# Patient Record
Sex: MALE | Race: WHITE | NOT HISPANIC OR LATINO | Employment: UNEMPLOYED | URBAN - METROPOLITAN AREA
[De-identification: names, ages, dates, MRNs, and addresses within clinical notes are randomized per-mention and may not be internally consistent; named-entity substitution may affect disease eponyms.]

---

## 2020-01-28 ENCOUNTER — HOSPITAL ENCOUNTER (EMERGENCY)
Facility: HOSPITAL | Age: 60
Discharge: HOME | End: 2020-01-28
Attending: EMERGENCY MEDICINE

## 2020-01-28 VITALS
RESPIRATION RATE: 16 BRPM | HEART RATE: 85 BPM | DIASTOLIC BLOOD PRESSURE: 86 MMHG | BODY MASS INDEX: 27.49 KG/M2 | OXYGEN SATURATION: 98 % | HEIGHT: 70 IN | WEIGHT: 192 LBS | SYSTOLIC BLOOD PRESSURE: 191 MMHG | TEMPERATURE: 99 F

## 2020-01-28 DIAGNOSIS — M79.672 LEFT FOOT PAIN: ICD-10-CM

## 2020-01-28 DIAGNOSIS — S90.32XA CONTUSION OF LEFT FOOT, INITIAL ENCOUNTER: Primary | ICD-10-CM

## 2020-01-28 PROCEDURE — 99283 EMERGENCY DEPT VISIT LOW MDM: CPT | Mod: 25

## 2020-01-28 PROCEDURE — 99284 EMERGENCY DEPT VISIT MOD MDM: CPT | Mod: ,,, | Performed by: EMERGENCY MEDICINE

## 2020-01-28 PROCEDURE — 99284 PR EMERGENCY DEPT VISIT,LEVEL IV: ICD-10-PCS | Mod: ,,, | Performed by: EMERGENCY MEDICINE

## 2020-01-28 RX ORDER — LISINOPRIL 20 MG/1
20 TABLET ORAL DAILY
Status: ON HOLD | COMMUNITY
End: 2023-01-20

## 2020-01-28 RX ORDER — NAPROXEN 500 MG/1
500 TABLET ORAL 2 TIMES DAILY WITH MEALS
Qty: 20 TABLET | Refills: 0 | Status: SHIPPED | OUTPATIENT
Start: 2020-01-28 | End: 2020-02-07

## 2020-01-28 NOTE — DISCHARGE INSTRUCTIONS
Your Xray today was negative for a fracture.  It is possible you sprained or contused your foot.    I recommend close follow up with your doctor. If you don't have a doctor you can call [facility] internal medicine or [facility] to help you find one.   I have also given you the information for orthopedics if you have persistent pain.    Elevate your foot. Use ice packs. Continue taking Aleve. Weight bear as tolerated.

## 2020-01-28 NOTE — ED NOTES
Patient identifiers verified and correct for Mr Jackson  C/C: Pain to left foot SEE NN  APPEARANCE: awake and alert in NAD.  SKIN: warm, dry and intact. No breakdown or bruising.  MUSCULOSKELETAL: Patient moving all extremities spontaneously, no obvious swelling or deformities noted. Ambulates independently.  RESPIRATORY: Denies shortness of breath.Respirations unlabored.   CARDIAC: Denies CP, 2+ distal pulses; no peripheral edema  ABDOMEN: S/ND/NT, Denies nausea  : voids spontaneously, denies difficulty  Neurologic: AAO x 4; follows commands equal strength in all extremities; denies numbness/tingling. Denies dizziness Denies weakness, pain to left side of left foot
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
## 2020-03-02 ENCOUNTER — HOSPITAL ENCOUNTER (EMERGENCY)
Facility: HOSPITAL | Age: 60
Discharge: HOME | End: 2020-03-02
Attending: EMERGENCY MEDICINE

## 2020-03-02 VITALS
BODY MASS INDEX: 27.63 KG/M2 | HEART RATE: 80 BPM | SYSTOLIC BLOOD PRESSURE: 144 MMHG | DIASTOLIC BLOOD PRESSURE: 94 MMHG | OXYGEN SATURATION: 94 % | RESPIRATION RATE: 19 BRPM | WEIGHT: 193 LBS | TEMPERATURE: 99 F | HEIGHT: 70 IN

## 2020-03-02 DIAGNOSIS — T78.3XXA ANGIOEDEMA, INITIAL ENCOUNTER: Primary | ICD-10-CM

## 2020-03-02 PROBLEM — T46.4X5A ANGIOTENSIN CONVERTING ENZYME INHIBITOR-AGGRAVATED ANGIOEDEMA: Status: ACTIVE | Noted: 2020-03-02

## 2020-03-02 LAB
BASOPHILS # BLD AUTO: 0.09 K/UL (ref 0–0.2)
BASOPHILS NFR BLD: 1 % (ref 0–1.9)
BUN SERPL-MCNC: 10 MG/DL (ref 6–30)
CHLORIDE SERPL-SCNC: 101 MMOL/L (ref 95–110)
CREAT SERPL-MCNC: 1.1 MG/DL (ref 0.5–1.4)
DIFFERENTIAL METHOD: ABNORMAL
EOSINOPHIL # BLD AUTO: 0.5 K/UL (ref 0–0.5)
EOSINOPHIL NFR BLD: 5.7 % (ref 0–8)
ERYTHROCYTE [DISTWIDTH] IN BLOOD BY AUTOMATED COUNT: 13.3 % (ref 11.5–14.5)
GLUCOSE SERPL-MCNC: 98 MG/DL (ref 70–110)
HCT VFR BLD AUTO: 41.1 % (ref 40–54)
HCT VFR BLD CALC: 41 %PCV (ref 36–54)
HGB BLD-MCNC: 13.3 G/DL (ref 14–18)
IMM GRANULOCYTES # BLD AUTO: 0.03 K/UL (ref 0–0.04)
IMM GRANULOCYTES NFR BLD AUTO: 0.3 % (ref 0–0.5)
LYMPHOCYTES # BLD AUTO: 2.2 K/UL (ref 1–4.8)
LYMPHOCYTES NFR BLD: 25.3 % (ref 18–48)
MCH RBC QN AUTO: 28.9 PG (ref 27–31)
MCHC RBC AUTO-ENTMCNC: 32.4 G/DL (ref 32–36)
MCV RBC AUTO: 89 FL (ref 82–98)
MONOCYTES # BLD AUTO: 1.1 K/UL (ref 0.3–1)
MONOCYTES NFR BLD: 13.2 % (ref 4–15)
NEUTROPHILS # BLD AUTO: 4.7 K/UL (ref 1.8–7.7)
NEUTROPHILS NFR BLD: 54.5 % (ref 38–73)
NRBC BLD-RTO: 0 /100 WBC
PLATELET # BLD AUTO: 398 K/UL (ref 150–350)
PMV BLD AUTO: 9.6 FL (ref 9.2–12.9)
POC IONIZED CALCIUM: 1.16 MMOL/L (ref 1.06–1.42)
POC TCO2 (MEASURED): 22 MMOL/L (ref 23–29)
POTASSIUM BLD-SCNC: 3.5 MMOL/L (ref 3.5–5.1)
RBC # BLD AUTO: 4.6 M/UL (ref 4.6–6.2)
SAMPLE: ABNORMAL
SODIUM BLD-SCNC: 134 MMOL/L (ref 136–145)
WBC # BLD AUTO: 8.65 K/UL (ref 3.9–12.7)

## 2020-03-02 PROCEDURE — 99291 CRITICAL CARE FIRST HOUR: CPT | Mod: ,,, | Performed by: EMERGENCY MEDICINE

## 2020-03-02 PROCEDURE — 85025 COMPLETE CBC W/AUTO DIFF WBC: CPT

## 2020-03-02 PROCEDURE — 96374 THER/PROPH/DIAG INJ IV PUSH: CPT

## 2020-03-02 PROCEDURE — 99291 PR CRITICAL CARE, E/M 30-74 MINUTES: ICD-10-PCS | Mod: ,,, | Performed by: EMERGENCY MEDICINE

## 2020-03-02 PROCEDURE — 99284 EMERGENCY DEPT VISIT MOD MDM: CPT | Mod: 25

## 2020-03-02 PROCEDURE — 96375 TX/PRO/DX INJ NEW DRUG ADDON: CPT

## 2020-03-02 PROCEDURE — 63600175 PHARM REV CODE 636 W HCPCS: Performed by: EMERGENCY MEDICINE

## 2020-03-02 RX ORDER — DIPHENHYDRAMINE HYDROCHLORIDE 50 MG/ML
50 INJECTION INTRAMUSCULAR; INTRAVENOUS
Status: COMPLETED | OUTPATIENT
Start: 2020-03-02 | End: 2020-03-02

## 2020-03-02 RX ORDER — DEXAMETHASONE SODIUM PHOSPHATE 4 MG/ML
10 INJECTION, SOLUTION INTRA-ARTICULAR; INTRALESIONAL; INTRAMUSCULAR; INTRAVENOUS; SOFT TISSUE
Status: COMPLETED | OUTPATIENT
Start: 2020-03-02 | End: 2020-03-02

## 2020-03-02 RX ORDER — AMLODIPINE BESYLATE 10 MG/1
10 TABLET ORAL DAILY
Qty: 30 TABLET | Refills: 0 | Status: SHIPPED | OUTPATIENT
Start: 2020-03-02 | End: 2021-09-03 | Stop reason: SDUPTHER

## 2020-03-02 RX ADMIN — DEXAMETHASONE SODIUM PHOSPHATE 10 MG: 4 INJECTION, SOLUTION INTRA-ARTICULAR; INTRALESIONAL; INTRAMUSCULAR; INTRAVENOUS; SOFT TISSUE at 03:03

## 2020-03-02 RX ADMIN — DIPHENHYDRAMINE HYDROCHLORIDE 50 MG: 50 INJECTION, SOLUTION INTRAMUSCULAR; INTRAVENOUS at 03:03

## 2020-03-02 NOTE — ED PROVIDER NOTES
Encounter Date: 3/2/2020       History     Chief Complaint   Patient presents with    Facial Swelling     neck keeps getting swollen,      59-year-old male, history of hypertension, on lisinopril, presenting with facial swelling since last night.  Patient went to sleep in his usual state of health and then awoke overnight because his tongue was swollen.  He went back to sleep and when he woke up this morning his tongue swelling had improved but he noticed that his upper neck was swollen and that he had a change in his voice.  He took his prescribed lisinopril at 7:00 a.m. this morning.  Since that time his symptoms have persisted but they have not actually worsened.  No shortness of breath. Positive abnormal sensation in his throat.    The history is provided by the patient.     Review of patient's allergies indicates:   Allergen Reactions    Lisinopril Swelling     Past Medical History:   Diagnosis Date    Hypertension     RLS (restless legs syndrome)      History reviewed. No pertinent surgical history.  History reviewed. No pertinent family history.  Social History     Tobacco Use    Smoking status: Current Every Day Smoker     Packs/day: 1.00     Types: Cigarettes    Smokeless tobacco: Never Used   Substance Use Topics    Alcohol use: Not Currently     Comment: occas    Drug use: No     Review of Systems   Constitutional: Negative for chills and fever.   HENT: Positive for sore throat and voice change. Negative for drooling and facial swelling.    Respiratory: Negative for cough, choking, chest tightness, shortness of breath and wheezing.    Musculoskeletal: Negative for neck pain.   Skin: Negative for rash.   All other systems reviewed and are negative.      Physical Exam     Initial Vitals [03/02/20 1509]   BP Pulse Resp Temp SpO2   (!) 154/70 90 18 98.7 °F (37.1 °C) 97 %      MAP       --         Physical Exam    Nursing note and vitals reviewed.  Constitutional: Vital signs are normal. He appears  well-developed and well-nourished. He is not diaphoretic.  Non-toxic appearance. He does not appear ill. No distress.   HENT:   Head: Normocephalic and atraumatic.   Mouth/Throat: Uvula is midline and mucous membranes are normal.   No lip or tongue swelling  Positive swelling of the uvula  Positive slight muffling of the voice  No drooling or stridor  Positive swelling to the right anterior region of the neck   Eyes: Conjunctivae and lids are normal.   Neck: Normal range of motion. Neck supple.   Cardiovascular: Normal rate, regular rhythm, S1 normal and S2 normal.   No murmur heard.  Pulmonary/Chest: Breath sounds normal. No respiratory distress.   Abdominal: Normal appearance. He exhibits no distension and no ascites.   Musculoskeletal: He exhibits no edema.   Neurological: He is alert and oriented to person, place, and time. He has normal strength. No cranial nerve deficit.   Skin: Skin is warm, dry and intact. No rash noted. No cyanosis. No pallor.   Psychiatric: He has a normal mood and affect. His speech is normal and behavior is normal. He is not actively hallucinating. He is attentive.         ED Course   Procedures  Labs Reviewed   CBC W/ AUTO DIFFERENTIAL - Abnormal; Notable for the following components:       Result Value    Hemoglobin 13.3 (*)     Platelets 398 (*)     Mono # 1.1 (*)     All other components within normal limits   ISTAT PROCEDURE - Abnormal; Notable for the following components:    POC Sodium 134 (*)     POC TCO2 (MEASURED) 22 (*)     All other components within normal limits          Imaging Results    None          Medical Decision Making:   History:   Old Medical Records: I decided to obtain old medical records.  Initial Assessment:   59-year-old male, on lisinopril for hypertension, now presenting with angioedema    He has hemodynamically stable but he does have signs of possible airway involvement, including a swollen uvula and voice change  Clinical Tests:   Lab Tests: Ordered and  Reviewed  ED Management:  ENT consulted for bedside scope  Labs  IV Benadryl and Decadron ordered  Anticipate admission overnight for airway watch              Attending Attestation:         Attending Critical Care:   Critical Care Times:   ==============================================================  · Total Critical Care Time - exclusive of procedural time: 35 minutes.  ==============================================================  Critical care was necessary to treat or prevent imminent or life-threatening deterioration of the following conditions: airway compromise.   Critical care was time spent personally by me on the following activities: obtaining history from patient or relative, examination of patient, review of x-rays / CT sent with the patient, review of old charts, ordering lab, x-rays, and/or EKG, ordering and performing treatments and interventions, evaluation of patient's response to treatment, development of treatment plan with patient or relative, discussion with consultants and re-evaluation of patient's conition.   Critical Care Condition: critical                             Clinical Impression:       ICD-10-CM ICD-9-CM   1. Angioedema, initial encounter T78.3XXA 995.1             ED Disposition Condition    Discharge Stable        ED Prescriptions     Medication Sig Dispense Start Date End Date Auth. Provider    amLODIPine (NORVASC) 10 MG tablet Take 1 tablet (10 mg total) by mouth once daily. 30 tablet 3/2/2020 3/2/2021 Hamzah Sandra MD        Follow-up Information     Follow up With Specialties Details Why Contact Info Additional Information    [facility] - Internal Medicine Internal Medicine   [address]  [facility] [place] [facility] [id number] [phone] [facility].                                     Mone Tran MD  03/03/20 3894

## 2020-03-02 NOTE — CONSULTS
Otolaryngology - Head and Neck Surgery  Consultation Report    Consultation From:  ER; Mone Tran MD    Chief Complaint:  Throat swelling    History of Present Illness: 59 y.o. male presents with progressive throat and neck swelling that began this AM. He reports he had voice changes as well, but has had some improvement in symptoms  Since ER steroids and other treatment. Denies previous episodes, but is on ACE-I.    Review of Systems reviewed including    CONSTITUTIONAL: no fevers, chills, weight loss, night sweats  EYES: no diplopia, no blurry vision  ENT: as above   NEURO: no motor or sensory deficits  CV: no CP, no palpitations  PULM: no cough, no SOB, no wheezing   GI: no abd pain, no constipation/diarrhea  : no dysuria, no hematuria  MSK: no bone/joint pain    Past Medical History: Patient has a past medical history of Hypertension and RLS (restless legs syndrome).    Past Surgical History: Patient has no past surgical history on file.    Social History: Patient reports that he has been smoking cigarettes. He has been smoking about 1.00 pack per day. He has never used smokeless tobacco. He reports that he drank alcohol. He reports that he does not use drugs.    Family History: family history is not on file.    Medications: No current facility-administered medications for this encounter.     Current Outpatient Medications:     lisinopril (PRINIVIL,ZESTRIL) 20 MG tablet, Take 20 mg by mouth once daily., Disp: , Rfl:     UNABLE TO FIND, medication name: medication for RLS starts with M, Disp: , Rfl:        Allergies: Patient has No Known Allergies.    Physical Exam:  Temp:  [98.7 °F (37.1 °C)] 98.7 °F (37.1 °C)  Pulse:  [90] 90  Resp:  [18] 18  SpO2:  [97 %] 97 %  BP: (154)/(70) 154/70    Intake/Output  No intake or output data in the 24 hours ending 03/02/20 1645      General: NAD  Neuro: AAOx3. CN II-XII intact.  Respiratory: No labored breathing, no stridor  Head/Face: Normal inspection  Salivary  glands WNL.  Eyes: EOMI; PERRLA   Right Ear: Auricle WNL. EAC WNL. TM normal.      Left Ear: Auricle WNL. EAC WNL. TM normal.  Nose: normal ext appearance and palpation; Negative sinus pressure/tenderness;. Normal septum, inferior turbinates, mucosa.   OC: Lips and gingiva wnl.  Good dentition. FOM Soft.  Anterior Tongue nml size and mobility; Hard Palate wnl Uvula and soft palate with moderate edema  OP: BOT WNL. Soft palate wnl with Midline uvula.  Tonsils 2+ bilaterally. Posterior oropharynx patent, wnl  Neck/Lymphatic: Mild fullness to anterior neck.   No thyromegaly. Full ROM.    Flexible Fiberoptic Laryngoscopy   Verbal consent obtained.  Overall findings:  Nasal Cavity- normal   Nasopharynx   Adenoid tissue - normal    eustachian tube orifices - normal   Oropharynx   Uvula and soft palate with moderate edema   Posterior pharyngeal wall - normal   Base of tongue - normal    Valleculae - normal  Hypopharynx   Pyriform sinuses - normal    Post-cricoid normal  Supraglottis   Epiglottis - normal    AE folds- normal   Arytenoids - normal   Interarytenoid space - normal   False vocal cords - normal   Glottis   True vocal cords - mild edema  Subglottis: normal    LABORATORY  CBC  Recent Labs   Lab 03/02/20  1542 03/02/20  1546   WBC 8.65  --    HGB 13.3*  --    HCT 41.1 41   MCV 89  --    *  --      BMP  No results for input(s): GLU, NA, K, CL, CO2, BUN, CREATININE, CALCIUM, PHOS, MG, PREALBUMIN in the last 72 hours.  COAGS  No results for input(s): PROTIME, INR, PTT in the last 72 hours.    IMAGING  Imaging Results    None          Assessment: 59 y.o. year old male with likely ACE-I induced angioedema. FFL with soft palate and uvular edema. Mild TVC edema possible from chronic smoking hx.    Plan:   -Decadron 8 mg IV q8h  -IV H1 and H2 blockade  -nebulized epinephrine and steroid prn  -humidified oxygen  -recommend close observation in Medical ICU with cont pulse ox and telemetry until marked turnaround in  stx  -low threshold for intubation   -please have airway/intubation supplies   - Cleared for soft diet from ENT perspective  - Page ENT w questions or concerns      Abdulaziz Dimas MD  Otolaryngology PGY-IV

## 2020-03-02 NOTE — ED NOTES
Patient identifiers verified and correct for Dylan Jackson.    LOC: The patient is awake, alert and aware of environment    APPEARANCE: Patient resting comfortably and in no acute distress, visible facial swelling     SKIN: The skin is warm and dry, color consistent with ethnicity, patient has normal skin turgor and moist mucus membranes, facial redness/ swelling     MUSCULOSKELETAL: Patient moving all extremities spontaneously,     RESPIRATORY: Airway is open and patent, respirations are spontaneous, patient has a normal effort and rate, no accessory muscle use noted, bilateral breath sounds clear  CARDIAC: Patient has a normal rate and regular rhythm, no periphreal edema noted, capillary refill < 3 seconds.  ABDOMEN: Soft and non tender to palpation, no distention noted, normoactive bowel sounds present in all four quadrants.  NEUROLOGIC: PERRLA, 3 mm bilaterally, eyes open spontaneously, behavior appropriate to situation, follows commands, facial expression symmetrical, bilateral hand grasp equal and even, purposeful motor response noted, normal sensation in all extremities when touched with a finger.

## 2020-03-02 NOTE — PROVIDER PROGRESS NOTES - EMERGENCY DEPT.
Signed out to me from previous attending pending ENT consultation.  ENT note some lower airway angioedema as well in recommends ICU placement for further treatment and care.  ICU consulted and will be down to admit patient.  On my evaluation patient's voice remains hoarse, however he has no airway compromise or difficulty managing his secretions.    Update 1800:  ICU down to see patient, at this time patient's symptoms have completely resolved.  He no longer has a hoarse voice or any throat or neck swelling. I discussed case with the ICU fellow, who feels comfortable discussed inpatient.  I explained to the patient that he has any return of change of voice, swelling, trouble breathing or any other signs of head or neck discomfort, he must return immediately to emergency department.  I will discontinue his lisinopril and start him on amlodipine 10 mg for home.

## 2020-03-02 NOTE — ED TRIAGE NOTES
Dylan Jackson, a 59 y.o. male presents to the ED w/ complaint of neck swelling, tongue swelling and a sore throat, which began this morning. The tongue swelling was mostly on the right side of the tongue. Patient can tolerate his own saliva, solids and liquids. Patient endorses cold symptoms starting this pass Thursday.       Patient denies allergies and changes in diet, no new food    Patient currently takes Lisinopril               Triage note:  Chief Complaint   Patient presents with    Facial Swelling     neck keeps getting swollen,      Review of patient's allergies indicates:  No Known Allergies  Past Medical History:   Diagnosis Date    Hypertension     RLS (restless legs syndrome)

## 2020-03-03 NOTE — HPI
"Mr. Kenny is a 60yo male with HTN, who presents to the ED with tongue swelling and neck swelling. Patient states he woke up this morning with this symptoms. His tongue swelling went away after one hour after which he went to work. Patient noted his voice was hoarse and neck was still swollen at that time so he decided to come to the ED. He denies any SOB and difficulty swallowing.  Patient reports that few days ago he suspect he was coming down with a "cold" so he started taking Debbie seltzer. His only medication is Lisinopril which he has been taking for 5 years.In ED, patient was given decadron 10mg IV and Benadryl 50mg. On evaluation, patient reports being back to baseline.  "

## 2020-03-03 NOTE — CONSULTS
"[facility]  Critical Care Medicine  Consult Note    Patient Name: Dylan Jackson  MRN: [id number]  Admission Date: 3/2/2020  Hospital Length of Stay: 0 days  Code Status: No Order  Attending Physician: Mone Tran MD   Primary Care Provider: Primary Doctor No   Principal Problem: Angiotensin converting enzyme inhibitor-aggravated angioedema    Inpatient consult to Critical Care Medicine  Consult performed by: Aaliyah Pandya DO  Consult ordered by: Hamzah Sandra MD  Reason for consult: Angioedema        Subjective:     HPI:  Mr. Kenny is a 58yo male with HTN, who presents to the ED with tongue swelling and neck swelling. Patient states he woke up this morning with this symptoms. His tongue swelling went away after one hour after which he went to work. Patient noted his voice was hoarse and neck was still swollen at that time so he decided to come to the ED. He denies any SOB and difficulty swallowing.  Patient reports that few days ago he suspect he was coming down with a "cold" so he started taking Debbie seltzer. His only medication is Lisinopril which he has been taking for 5 years.In ED, patient was given decadron 10mg IV and Benadryl 50mg. On evaluation, patient reports being back to baseline.    Hospital/ICU Course:  No notes on file    Past Medical History:   Diagnosis Date    Hypertension     RLS (restless legs syndrome)        History reviewed. No pertinent surgical history.    Review of patient's allergies indicates:   Allergen Reactions    Lisinopril Swelling       Family History     None        Tobacco Use    Smoking status: Current Every Day Smoker     Packs/day: 1.00     Types: Cigarettes    Smokeless tobacco: Never Used   Substance and Sexual Activity    Alcohol use: Not Currently     Comment: occas    Drug use: No    Sexual activity: Not on file      Review of Systems   Constitutional: Negative for activity change and chills.   HENT: Positive for facial swelling. " Negative for congestion.    Eyes: Negative for photophobia and visual disturbance.   Respiratory: Positive for cough. Negative for choking, chest tightness and shortness of breath.    Cardiovascular: Negative for chest pain, palpitations and leg swelling.   Gastrointestinal: Negative for abdominal pain, constipation, diarrhea and nausea.   Genitourinary: Negative for difficulty urinating and dysuria.   Musculoskeletal: Negative for back pain and neck pain.   Skin: Negative for color change and pallor.   Neurological: Negative for weakness and headaches.   Psychiatric/Behavioral: Positive for confusion. Negative for agitation.     Objective:     Vital Signs (Most Recent):  Temp: 98.7 °F (37.1 °C) (03/02/20 1509)  Pulse: 79 (03/02/20 1702)  Resp: 19 (03/02/20 1702)  BP: 132/78 (03/02/20 1702)  SpO2: 95 % (03/02/20 1702) Vital Signs (24h Range):  Temp:  [98.7 °F (37.1 °C)] 98.7 °F (37.1 °C)  Pulse:  [79-90] 79  Resp:  [18-19] 19  SpO2:  [95 %-97 %] 95 %  BP: (132-154)/(70-78) 132/78   Weight: 87.5 kg (193 lb)  Body mass index is 27.69 kg/m².    No intake or output data in the 24 hours ending 03/02/20 1833    Physical Exam   Constitutional: He is oriented to person, place, and time. He appears well-developed and well-nourished. No distress.   HENT:   Head: Normocephalic and atraumatic.   Mouth/Throat: Oropharynx is clear and moist.   Eyes: Pupils are equal, round, and reactive to light. No scleral icterus.   Neck: Normal range of motion.   Cardiovascular: Normal rate, regular rhythm, normal heart sounds and intact distal pulses.   Pulmonary/Chest: Effort normal and breath sounds normal. No stridor. No respiratory distress. He has no wheezes. He has no rales.   Abdominal: Soft. Bowel sounds are normal. He exhibits no distension. There is no tenderness. There is no guarding.   Musculoskeletal: He exhibits no edema or tenderness.   Neurological: He is alert and oriented to person, place, and time.   Skin: Skin is warm. He  is not diaphoretic. No erythema. No pallor.   Psychiatric: He has a normal mood and affect. His behavior is normal. Thought content normal.       Vents:     Lines/Drains/Airways     Peripheral Intravenous Line                 Peripheral IV - Single Lumen 03/02/20 1540 18 G Anterior;Distal;Right Upper Arm less than 1 day              Significant Labs:    CBC/Anemia Profile:  Recent Labs   Lab 03/02/20  1542 03/02/20  1546   WBC 8.65  --    HGB 13.3*  --    HCT 41.1 41   *  --    MCV 89  --    RDW 13.3  --         Chemistries:  No results for input(s): NA, K, CL, CO2, BUN, CREATININE, CALCIUM, ALBUMIN, PROT, BILITOT, ALKPHOS, ALT, AST, GLUCOSE, MG, PHOS in the last 48 hours.  Significant Imaging: n/a      ABG  No results for input(s): PH, PO2, PCO2, HCO3, BE in the last 168 hours.  Assessment/Plan:     Angiotensin converting enzyme inhibitor-aggravated angioedema  Patient with tongue and neck swelling this morning. Only on Lisinopril at home, suspect angiodema 2/2 to lisinopril. On evaluation patients symptoms completely resolved. Tolerating a diet, denies SOB, hoarseness or neck pain.  He was given decadron 10mg IV and Benadryl 50mg.    Plan  - Recommend discontinuation of Lisinopril. Switch to another agent for BP control  - No ICU needs at this time  - Patient to return to ED if symptoms reoccur, however low suspicion.      Thank you for your consult. I will sign off. Please contact us if you have any additional questions.     Aaliyah Pandya, DO  Critical Care Medicine  [facility]

## 2020-03-03 NOTE — SUBJECTIVE & OBJECTIVE
Past Medical History:   Diagnosis Date    Hypertension     RLS (restless legs syndrome)        History reviewed. No pertinent surgical history.    Review of patient's allergies indicates:   Allergen Reactions    Lisinopril Swelling       Family History     None        Tobacco Use    Smoking status: Current Every Day Smoker     Packs/day: 1.00     Types: Cigarettes    Smokeless tobacco: Never Used   Substance and Sexual Activity    Alcohol use: Not Currently     Comment: occas    Drug use: No    Sexual activity: Not on file      Review of Systems   Constitutional: Negative for activity change and chills.   HENT: Positive for facial swelling. Negative for congestion.    Eyes: Negative for photophobia and visual disturbance.   Respiratory: Positive for cough. Negative for choking, chest tightness and shortness of breath.    Cardiovascular: Negative for chest pain, palpitations and leg swelling.   Gastrointestinal: Negative for abdominal pain, constipation, diarrhea and nausea.   Genitourinary: Negative for difficulty urinating and dysuria.   Musculoskeletal: Negative for back pain and neck pain.   Skin: Negative for color change and pallor.   Neurological: Negative for weakness and headaches.   Psychiatric/Behavioral: Positive for confusion. Negative for agitation.     Objective:     Vital Signs (Most Recent):  Temp: 98.7 °F (37.1 °C) (03/02/20 1509)  Pulse: 79 (03/02/20 1702)  Resp: 19 (03/02/20 1702)  BP: 132/78 (03/02/20 1702)  SpO2: 95 % (03/02/20 1702) Vital Signs (24h Range):  Temp:  [98.7 °F (37.1 °C)] 98.7 °F (37.1 °C)  Pulse:  [79-90] 79  Resp:  [18-19] 19  SpO2:  [95 %-97 %] 95 %  BP: (132-154)/(70-78) 132/78   Weight: 87.5 kg (193 lb)  Body mass index is 27.69 kg/m².    No intake or output data in the 24 hours ending 03/02/20 1833    Physical Exam   Constitutional: He is oriented to person, place, and time. He appears well-developed and well-nourished. No distress.   HENT:   Head: Normocephalic and  atraumatic.   Mouth/Throat: Oropharynx is clear and moist.   Eyes: Pupils are equal, round, and reactive to light. No scleral icterus.   Neck: Normal range of motion.   Cardiovascular: Normal rate, regular rhythm, normal heart sounds and intact distal pulses.   Pulmonary/Chest: Effort normal and breath sounds normal. No stridor. No respiratory distress. He has no wheezes. He has no rales.   Abdominal: Soft. Bowel sounds are normal. He exhibits no distension. There is no tenderness. There is no guarding.   Musculoskeletal: He exhibits no edema or tenderness.   Neurological: He is alert and oriented to person, place, and time.   Skin: Skin is warm. He is not diaphoretic. No erythema. No pallor.   Psychiatric: He has a normal mood and affect. His behavior is normal. Thought content normal.       Vents:     Lines/Drains/Airways     Peripheral Intravenous Line                 Peripheral IV - Single Lumen 03/02/20 1540 18 G Anterior;Distal;Right Upper Arm less than 1 day              Significant Labs:    CBC/Anemia Profile:  Recent Labs   Lab 03/02/20  1542 03/02/20  1546   WBC 8.65  --    HGB 13.3*  --    HCT 41.1 41   *  --    MCV 89  --    RDW 13.3  --         Chemistries:  No results for input(s): NA, K, CL, CO2, BUN, CREATININE, CALCIUM, ALBUMIN, PROT, BILITOT, ALKPHOS, ALT, AST, GLUCOSE, MG, PHOS in the last 48 hours.  Significant Imaging: n/a

## 2020-03-03 NOTE — ASSESSMENT & PLAN NOTE
Patient with tongue and neck swelling this morning. Only on Lisinopril at home, suspect angiodema 2/2 to lisinopril. On evaluation patients symptoms completely resolved. Tolerating a diet, denies SOB, hoarseness or neck pain.  He was given decadron 10mg IV and Benadryl 50mg.    Plan  - Recommend discontinuation of Lisinopril. Switch to another agent for BP control  - No ICU needs at this time  - Patient to return to ED if symptoms reoccur, however low suspicion.

## 2021-03-18 ENCOUNTER — HOSPITAL ENCOUNTER (EMERGENCY)
Facility: HOSPITAL | Age: 61
Discharge: HOME | End: 2021-03-18
Attending: EMERGENCY MEDICINE

## 2021-03-18 VITALS
DIASTOLIC BLOOD PRESSURE: 92 MMHG | WEIGHT: 200 LBS | OXYGEN SATURATION: 99 % | BODY MASS INDEX: 28.63 KG/M2 | SYSTOLIC BLOOD PRESSURE: 175 MMHG | HEART RATE: 58 BPM | RESPIRATION RATE: 20 BRPM | TEMPERATURE: 98 F | HEIGHT: 70 IN

## 2021-03-18 DIAGNOSIS — S61.012A LACERATION OF LEFT THUMB WITHOUT FOREIGN BODY WITHOUT DAMAGE TO NAIL, INITIAL ENCOUNTER: Primary | ICD-10-CM

## 2021-03-18 PROCEDURE — 99284 EMERGENCY DEPT VISIT MOD MDM: CPT | Mod: 25,,, | Performed by: EMERGENCY MEDICINE

## 2021-03-18 PROCEDURE — 99284 PR EMERGENCY DEPT VISIT,LEVEL IV: ICD-10-PCS | Mod: 25,,, | Performed by: EMERGENCY MEDICINE

## 2021-03-18 PROCEDURE — 99283 EMERGENCY DEPT VISIT LOW MDM: CPT | Mod: 25

## 2021-03-18 PROCEDURE — 12002 RPR S/N/AX/GEN/TRNK2.6-7.5CM: CPT | Mod: ,,, | Performed by: EMERGENCY MEDICINE

## 2021-03-18 PROCEDURE — 12002 RPR S/N/AX/GEN/TRNK2.6-7.5CM: CPT

## 2021-03-18 PROCEDURE — 12002 PR RESUP NPTERF WND BODY 2.6-7.5 CM: ICD-10-PCS | Mod: ,,, | Performed by: EMERGENCY MEDICINE

## 2021-03-18 PROCEDURE — 25000003 PHARM REV CODE 250: Performed by: EMERGENCY MEDICINE

## 2021-03-18 RX ORDER — LIDOCAINE HYDROCHLORIDE 10 MG/ML
10 INJECTION, SOLUTION EPIDURAL; INFILTRATION; INTRACAUDAL; PERINEURAL
Status: COMPLETED | OUTPATIENT
Start: 2021-03-18 | End: 2021-03-18

## 2021-03-18 RX ORDER — BACITRACIN ZINC 500 [USP'U]/G
1 OINTMENT TOPICAL
Status: COMPLETED | OUTPATIENT
Start: 2021-03-18 | End: 2021-03-18

## 2021-03-18 RX ADMIN — BACITRACIN 1 EACH: 500 OINTMENT TOPICAL at 11:03

## 2021-03-18 RX ADMIN — LIDOCAINE HYDROCHLORIDE 100 MG: 10 INJECTION, SOLUTION EPIDURAL; INFILTRATION; INTRACAUDAL at 10:03

## 2021-09-03 ENCOUNTER — HOSPITAL ENCOUNTER (EMERGENCY)
Facility: HOSPITAL | Age: 61
Discharge: HOME | End: 2021-09-03
Attending: EMERGENCY MEDICINE

## 2021-09-03 VITALS
OXYGEN SATURATION: 97 % | DIASTOLIC BLOOD PRESSURE: 97 MMHG | RESPIRATION RATE: 16 BRPM | TEMPERATURE: 98 F | SYSTOLIC BLOOD PRESSURE: 181 MMHG | HEART RATE: 87 BPM

## 2021-09-03 DIAGNOSIS — Z76.0 ENCOUNTER FOR MEDICATION REFILL: Primary | ICD-10-CM

## 2021-09-03 PROCEDURE — 99283 PR EMERGENCY DEPT VISIT,LEVEL III: ICD-10-PCS | Mod: ,,, | Performed by: EMERGENCY MEDICINE

## 2021-09-03 PROCEDURE — 99283 EMERGENCY DEPT VISIT LOW MDM: CPT | Mod: ,,, | Performed by: EMERGENCY MEDICINE

## 2021-09-03 PROCEDURE — 99281 EMR DPT VST MAYX REQ PHY/QHP: CPT

## 2021-09-03 RX ORDER — HYDROCODONE BITARTRATE AND ACETAMINOPHEN 5; 325 MG/1; MG/1
1 TABLET ORAL EVERY 6 HOURS PRN
Qty: 28 TABLET | Refills: 0 | Status: SHIPPED | OUTPATIENT
Start: 2021-09-03 | End: 2021-09-10

## 2021-09-03 RX ORDER — AMLODIPINE BESYLATE 10 MG/1
10 TABLET ORAL DAILY
Qty: 30 TABLET | Refills: 0 | Status: SHIPPED | OUTPATIENT
Start: 2021-09-03 | End: 2023-01-20

## 2021-09-03 RX ORDER — METOPROLOL SUCCINATE 50 MG/1
50 TABLET, EXTENDED RELEASE ORAL DAILY
Qty: 30 TABLET | Refills: 0 | Status: SHIPPED | OUTPATIENT
Start: 2021-09-03 | End: 2023-01-20

## 2022-04-04 NOTE — ED NOTES
Bed: 06  Expected date:   Expected time:   Means of arrival:   Comments:  Intake 4   PHYSICAL MEDICINE AND REHABILITATION  CONSULTATION & PRE-ADMISSION SCREENING      Consulting Physician: Dr. Faith Barber  Attending Physician: Gian York DO   Other Physicians: Patient Care Team:  Rubén Hernandez MD as PCP - General (Student)  Bertin Fay MD as Cardiologist (Cardiology)  Alexis Richardson MD (Inactive) as Endocrinologist (Endocrinology)  Rubén Hernandez MD (Student)  Katy Linda MD as Consulting Physician (Infectious Diseases)    Reason for Consultation: To assess further rehabilitation needs    Chief Complaint: Impaired mobility, self-care, transfers, and ADLs due to cervical surgery    History was obtained from chart review and from patient.    History Of Present Illness  Live Cast is a 61 year old male with seizure disorder, history of CVA and chronic cognitive impairment, Hepatitis C, HTN, HL, CLBP, repeated falls, who presented to [facility] on 3/29/22 with worsening low back pain, right leg pain, and increased falls.  CTH was unremarkable. MRIs of the cervical and lumbar spine showed severe C4/5 spinal stenosis with focal spinal cord signal and an ill-defined L4 fluid collection.  ESR was 28. The patient was transferred to [facility] on 3/30/22 for neurosurgery evaluation. On 3/31, he was taken for C4-5 ACDF and on 4/1, he was taken for C3-6 PCDF by Dr. Mc. He is to wear a HCC at all times, but has been taking it off.  He has been confused. He was seen by PT and OT on 4/2 and complained of 10/10 pain.  He required min A of 2 for trasnfers and 4ft with RW.  He required constant verbal cues with decreased safety awareness.  He is receiving flexeril 5 TID.  We have been asked to evaluate the patient for rehab recommendations.      Past Medical History  Past Medical History:   Diagnosis Date   • Aortic root dilation (CMS/HCC)    • Aortic stenosis    • Aortic valve regurgitation    • Arthritis    • At risk for falls 06/24/2021   • Back problem     pain and immobility    • Bicuspid aortic valve    • Bunion    • Calcium nephrolithiasis    • Cerebral infarction (CMS/HCC) 07/18/2020   • Chronic hepatitis (CMS/HCC)    • Chronic hepatitis C (CMS/HCC)     Treated with cure, SVR 2/8/16   • Chronic kidney disease (CKD)    • Chronic pain     back   • Compartment syndrome of upper extremity (CMS/HCC) 07/18/2020    Right   • Corns and callosities    • Coronary artery disease    • DDD (degenerative disc disease), lumbar    • Depression 2001   • Depression    • Diabetes mellitus, type 2 (CMS/HCC)    • Erectile dysfunction     takes Testosterone shots every month   • Esophageal reflux    • Essential (primary) hypertension    • Fracture, jaw (CMS/HCC) 1985   • Glaucoma 07/2014   • Hematuria    • Hypertension    • Impaired mobility and ADLs 06/24/2021    Stands [employer] Pivots with assistance, \"walks a few steps with assistance\" Cane/wheelchair use   • Liver disease    • Nephrolithiasis 4/17/2017, 11/2017   • Other and unspecified hyperlipidemia    • Right sided weakness     \"drags right foot\" per Caretaker   • Seizure disorder (CMS/HCC)    • Seizures (CMS/HCC)     last in 2004, on no medications   • Smoker 04/2017    last 40 years, has the patch but hasn't started yet   • Tobacco use    • Type 2 diabetes mellitus with diabetic neuropathy, without long-term current use of insulin (CMS/HCC) 08/23/2016   • Unspecified part of closed fracture of clavicle 1970    10 years old, fractured while wrestling his brother. Wore a brace on that arm for healing.         Surgical History  Past Surgical History:   Procedure Laterality Date   • Arm debridement Right 07/24/2020   • Colonoscopy diagnostic  08/05/2014    Affi 10yr recall   • Esophagogastroduodenoscopy transoral flex diag  08/05/2014    Affi 3yr recall, varices   • Esophagogastroduodenoscopy transoral flex diag  09/22/2016    Varices, bx's no cancer H-pylori negative   • Esophagogastroduodenoscopy transoral flex diag  11/15/2018    Affi, MAC, varices, no  banding, recall 2 year   • Esophagogastroduodenoscopy transoral flex w/place gastrostomy tube  08/26/2020    Dr. Esteban, [facility]    • Fasciotomy upper arm Right 07/18/2020   • Fracture surgery  1982    Mandible fracture; Wired jaw    • Removal gallbladder      Lap.   • Skin graft     • Split autogrft,trunk,arm,leg <100sqcm Right 09/01/2020    Right forearm split thickness skin graft       Family History:  Family History   Problem Relation Age of Onset   • Heart disease Mother    • Lung Disease Mother    • Tuberculosis Mother 29        1960s   • Stroke Mother    • Depression Mother    • Hypertension Mother    • Cancer Father 56        colon   • Asthma Brother    • Stroke Maternal Grandmother    • Heart disease Maternal Grandmother          Social History  Patient  reports that he has been smoking cigarettes. He has been smoking about 0.25 packs per day. He has never used smokeless tobacco. He reports previous alcohol use. He reports that he does not use drugs.    Prior Living Situation:  Prior Living Situation  Information Provided By:: patient  Type of Home: Apartment  # Steps to Enter: 14  # Steps in the Home: 0  Number of Rails: 1  Lives With: Daughter  Receives Help From: Family  Home Equipment: Two-wheeled walker, Cane  He is reportedly homeless. He has family that will not take him in. He has [facility] insurance.    Prior Communication and Cognition:   supervision to min A    Prior Level of Function:    Prior Function  Prior ADL: Minimal Assist (Min) (from dtr)  Ambulation in the Home: Minimal Assist (Min)  Ambulation in the Community: Minimal Assist (Min)  Steps into the Home: Minimal Assist (Min)  Locomotion Equipment: Two wheeled walker, Cane  History of Falls in past year: Yes  Number of falls in past year: 7  Any fall with injury?: Yes  Prior Homemaking/IADLs: Needs assistance (dtr assisting with all tasks)    Current Level of Function: min A of 2    Allergies  ALLERGIES:  Lisinopril and Testosterone  [androgel]    Medications  Current Facility-Administered Medications   Medication Dose Route Frequency Provider Last Rate Last Admin   • carvedilol (COREG) tablet 3.125 mg  3.125 mg Oral 2 times per day Gian York DO   3.125 mg at 04/04/22 1122   • insulin glargine (LANTUS) injection 5 Units  5 Units Subcutaneous Daily Gian York DO   5 Units at 04/04/22 1123   • hydrALAZINE (APRESOLINE) tablet 10 mg  10 mg Oral Q8H PRN Gian York, DO       • dextrose 50 % injection 25 g  25 g Intravenous PRN Gian York, DO       • dextrose 50 % injection 12.5 g  12.5 g Intravenous PRN Gian York, DO       • glucagon (GLUCAGEN) injection 1 mg  1 mg Intramuscular PRN Gian York, DO       • dextrose (GLUTOSE) 40 % gel 15 g  15 g Oral PRN Gian York, DO       • dextrose (GLUTOSE) 40 % gel 30 g  30 g Oral PRN Gian York, DO       • docusate sodium-sennosides (SENOKOT S) 50-8.6 MG 1 tablet  1 tablet Oral Daily Shelly Parry MD   1 tablet at 04/03/22 0955   • polyethylene glycol (MIRALAX) packet 17 g  17 g Oral Daily Shelly Parry MD   17 g at 04/03/22 0956   • amLODIPine (NORVASC) tablet 2.5 mg  2.5 mg Oral Daily Shelly Parry MD   2.5 mg at 04/04/22 0916   • pregabalin (LYRICA) capsule 100 mg  100 mg Oral BID Shelly Parry MD   100 mg at 04/04/22 0915   • nalbuphine (NUBAIN) injection 2.5 mg  2.5 mg Intravenous Q8H PRN Amaya Marina PA-C       • naLOXone (NARCAN) injection 0.1 mg  0.1 mg Intravenous PRN Amaya Marina PA-C       • sodium chloride 0.9% infusion   Intravenous Continuous Amaya BREE Marina 25 mL/hr at 04/01/22 1539 New Bag at 04/01/22 1539   • morphine injection 2 mg  2 mg Intravenous Q2H PRN Corrine Otero PA-C   2 mg at 04/02/22 2014   • cyclobenzaprine (FLEXERIL) tablet 5 mg  5 mg Oral TID Corrine Otero PA-C   5 mg at 04/04/22 1445   • heparin (porcine) injection 5,000 Units  5,000 Units Subcutaneous 3 times per day Corrine Otero PA-C   5,000 Units at 04/04/22 1445   • bisacodyl (DULCOLAX) suppository 10  mg  10 mg Rectal Daily PRN Corrine Otero PA-C       • magnesium hydroxide (MILK OF MAGNESIA) 400 MG/5ML suspension 30 mL  30 mL Oral Daily PRN Corrine Otero PA-C       • HYDROcodone-acetaminophen (NORCO) 5-325 MG per tablet 1 tablet  1 tablet Oral Q4H PRN Corrine Otero PA-C   1 tablet at 04/02/22 1043    Or   • HYDROcodone-acetaminophen (NORCO)  MG per tablet 1 tablet  1 tablet Oral Q4H PRN Corrine Otero PA-C   1 tablet at 04/02/22 1751   • ARIPiprazole (ABILIFY) tablet 15 mg  15 mg Oral Daily Rowan Perales MD   15 mg at 04/04/22 0916   • [Held by provider] aspirin (ECOTRIN) enteric coated tablet 81 mg  81 mg Oral Daily Rowan Perales MD   81 mg at 03/30/22 0859   • divalproex (DEPAKOTE ER) 24 hr ER tablet 250 mg  250 mg Oral Daily Rowan Perales MD   250 mg at 04/04/22 0916   • pantoprazole (PROTONIX) EC tablet 40 mg  40 mg Oral Daily Rowan Perales MD   40 mg at 04/04/22 0915   • tamsulosin (FLOMAX) capsule 0.4 mg  0.4 mg Oral Daily PC Rowan Perales MD   0.4 mg at 04/04/22 0916   • sodium chloride 0.9 % flush bag 25 mL  25 mL Intravenous PRN Rowan Perales MD       • sodium chloride (PF) 0.9 % injection 2 mL  2 mL Intracatheter 2 times per day Rowan Perales MD   2 mL at 04/03/22 2000   • atorvastatin (LIPITOR) tablet 10 mg  10 mg Oral Daily Shelly Parry MD   10 mg at 04/04/22 0916   • influenza virus quadrivalent vaccine inactivated (PRESERVATIVE FREE) injection 0.5 mL  0.5 mL Intramuscular Once Shelly Parry MD       • acetaminophen (TYLENOL) tablet 500 mg  500 mg Oral Q6H PRN Shelly Parry MD   500 mg at 03/31/22 0642   • ondansetron (ZOFRAN) injection 4 mg  4 mg Intravenous Q8H PRN Shelly Parry MD            Review of Systems  Review of Systems   Unable to perform ROS: Patient unresponsive (He does not respond to questions for me.)        Diet  Daily W Dinner; Ensure Enlive/standard Oral Supplement, [place] Oral Nutrition Supplement  Nutrition Communication  2 Times/day W Breakfast & Lunch;  Ensure Surgery Immunonutrition/immunonutrition Drink ((for Diabetic Patient, Give ½ Drink (4oz) With Each Meal And At Hs)) Oral Nutrition Supplement  Consistent Carb [place] (60-90 Gm/meal) Diet    No active isolations     Code Status    Code Status: Full Resuscitation    Last Recorded Vitals  Visit Vitals  /74   Pulse (!) 101   Temp 98.2 °F (36.8 °C) (Axillary)   Resp 18   Ht 5' 7\" (1.702 m)   Wt 71 kg (156 lb 8.4 oz)   SpO2 95%   BMI 24.52 kg/m²       Physical Exam  Physical Exam  Vitals reviewed.   Constitutional:       Comments: Sleepy, mostly keeps eyes closed.   HENT:      Head: Normocephalic.   Eyes:      Comments: Mostly kept closed   Neck:      Comments: HCC in place  Cardiovascular:      Rate and Rhythm: Tachycardia present.   Pulmonary:      Effort: Pulmonary effort is normal.   Abdominal:      Palpations: Abdomen is soft.      Tenderness: There is no abdominal tenderness.   Musculoskeletal:         General: Normal range of motion.      Right lower leg: No edema.      Left lower leg: No edema.      Comments: Healed scarring over right forearm.   Neurological:      Mental Status: He is disoriented.      Motor: Weakness (Antigravity strength throughout but does not offer resistance.) present.      Coordination: Coordination abnormal (Decreased FFM on the right noted).      Comments: Follows simple commands.           Labs  Recent Results (from the past 24 hour(s))   Comprehensive Metabolic Panel    Collection Time: 04/04/22  5:30 AM   Result Value Ref Range    Fasting Status      Sodium 133 (L) 135 - 145 mmol/L    Potassium 4.0 3.4 - 5.1 mmol/L    Chloride 101 98 - 107 mmol/L    Carbon Dioxide 27 21 - 32 mmol/L    Anion Gap 9 (L) 10 - 20 mmol/L    Glucose 174 (H) 70 - 99 mg/dL    BUN 13 6 - 20 mg/dL    Creatinine 0.81 0.67 - 1.17 mg/dL    Glomerular Filtration Rate >90 >=60    BUN/ Creatinine Ratio 16 7 - 25    Calcium 9.5 8.4 - 10.2 mg/dL    Bilirubin, Total 0.4 0.2 - 1.0 mg/dL    GOT/AST 26 <=37  Units/L    GPT/ALT 22 <64 Units/L    Alkaline Phosphatase 66 45 - 117 Units/L    Albumin 3.0 (L) 3.6 - 5.1 g/dL    Protein, Total 7.4 6.4 - 8.2 g/dL    Globulin 4.4 (H) 2.0 - 4.0 g/dL    A/G Ratio 0.7 (L) 1.0 - 2.4   CBC with Automated Differential (performable only)    Collection Time: 04/04/22  5:30 AM   Result Value Ref Range    WBC 9.1 4.2 - 11.0 K/mcL    RBC 3.92 (L) 4.50 - 5.90 mil/mcL    HGB 13.0 13.0 - 17.0 g/dL    HCT 38.8 (L) 39.0 - 51.0 %    MCV 99.0 78.0 - 100.0 fl    MCH 33.2 26.0 - 34.0 pg    MCHC 33.5 32.0 - 36.5 g/dL    RDW-CV 12.5 11.0 - 15.0 %    RDW-SD 45.1 39.0 - 50.0 fL     140 - 450 K/mcL    NRBC 0 <=0 /100 WBC    Neutrophil, Percent 51 %    Lymphocytes, Percent 31 %    Mono, Percent 15 %    Eosinophils, Percent 2 %    Basophils, Percent 0 %    Immature Granulocytes 1 %    Absolute Neutrophils 4.6 1.8 - 7.7 K/mcL    Absolute Lymphocytes 2.9 1.0 - 4.0 K/mcL    Absolute Monocytes 1.4 (H) 0.3 - 0.9 K/mcL    Absolute Eosinophils  0.2 0.0 - 0.5 K/mcL    Absolute Basophils 0.0 0.0 - 0.3 K/mcL    Absolute Immmature Granulocytes 0.1 0.0 - 0.2 K/mcL   Electrocardiogram 12-Lead    Collection Time: 04/04/22  2:26 PM   Result Value Ref Range    Ventricular Rate EKG/Min (BPM) 101     Atrial Rate (BPM) 101     AK-Interval (MSEC) 144     QRS-Interval (MSEC) 88     QT-Interval (MSEC) 340     QTc 441     P Axis (Degrees) 41     R Axis (Degrees) -58     T Axis (Degrees) 32     REPORT TEXT       Sinus tachycardia  Left axis deviation  Abnormal ECG  When compared with ECG of  29-MAR-2022 08:29,  premature ventricular complexes  are no longer  present  Confirmed by PAPA SORIANO MD ([id number]) on 4/4/2022 2:39:30 PM         Impresssion  1. Cervical myelopathy with incomplete quadriplegia  2. Severe cervical stenosis from C4/5  3. C4-5 ACDF, HCC at all times  4. C3-6 PCDF, HCC at all times  5. Repeated falls with fall risk  6. Remote CVA with chronic cognitive impairments  7. Evidence of remote right forearm  injury with weakness in right intrinsic hand muscles  8. Hepatitis C  9. History of seizure disorder  10. CLBP  11. DVT/PE risk    Plan/ Rehabilitation Recommendations    IMPAIRMENT GROUP CODE: 4.1211    RECOMMENDED LEVEL OF CARE:  This patient appears most appropriate for subacute rehabilitation for a longer, slower rehab course. He will significantly benefit from continued therapy but it does not appear that he would consistently be able to actively participate in an intensive rehab program.  Social situation also makes discharge home following rehab questionable.    PRIOR LEVEL OF FUNCTION: supervision to min A    EXPECTED LEVEL OF IMPROVEMENT: supervision to min A    ESTIMATED LENGTH OF ZACHARY STAY: 3 weeks    RISK OF CLINICAL COMPLICATIONS: The patient is at increased risk for respiratory failure, falls, fatigue, infection, alteration in skin integrity, pain, DVT, PE, dehydration, electrolyte imbalance, poor nutrition, altered sleep pattern, alteration in bowel/bladder control, and cardiopulmonary events. The patient's cardiopulmonary response to exercise and activity will be monitored by nursing and therapy staff with regular checks of blood pressure, pulse rate, and oxygen saturations.      TREATMENTS NEEDED: The patient will need 1-2 hours/day, 5 days/week consisting of:  - Occupational Therapy to address endurance, activity tolerance, strength, range of motion, coordination, balance, safe transfers, and self-care.   - Physical Therapy to address endurance, strength, range of motion, safe ambulation/stair management with appropriate assistive device.  - Speech Therapy for trial cognitive evaluation and treatment.      ANTICIPATED post ZACHARY DISCHARGE DESTINATION: possibly ECF or shelter or home if family will take him    These recommendations were discussed with the RN.  anticipates placement will be difficult as his social history may preclude ZACHARY. Our rehab team will continue to follow the  patient with you intermittently for functional improvement.    Thank you for this rehab consultation.    Faith Barber MD  4/4/2022 5:01 PM

## 2023-01-06 ENCOUNTER — TELEPHONE (OUTPATIENT)
Dept: ADMINISTRATIVE | Facility: OTHER | Age: 63
End: 2023-01-06

## 2023-01-13 ENCOUNTER — OFFICE VISIT (OUTPATIENT)
Dept: SURGERY | Facility: CLINIC | Age: 63
End: 2023-01-13

## 2023-01-13 VITALS
WEIGHT: 198.19 LBS | BODY MASS INDEX: 28.37 KG/M2 | SYSTOLIC BLOOD PRESSURE: 190 MMHG | HEIGHT: 70 IN | DIASTOLIC BLOOD PRESSURE: 110 MMHG | HEART RATE: 74 BPM

## 2023-01-13 DIAGNOSIS — L72.3 SEBACEOUS CYST: Primary | ICD-10-CM

## 2023-01-13 DIAGNOSIS — D17.1 LIPOMA OF BACK: ICD-10-CM

## 2023-01-13 PROCEDURE — 99243 PR OFFICE CONSULTATION,LEVEL III: ICD-10-PCS | Mod: S$PBB,25,, | Performed by: SURGERY

## 2023-01-13 PROCEDURE — 99243 OFF/OP CNSLTJ NEW/EST LOW 30: CPT | Mod: S$PBB,25,, | Performed by: SURGERY

## 2023-01-13 PROCEDURE — 99999 PR PBB SHADOW E&M-EST. PATIENT-LVL V: ICD-10-PCS | Mod: PBBFAC,,, | Performed by: SURGERY

## 2023-01-13 PROCEDURE — 10061 I&D ABSCESS COMP/MULTIPLE: CPT | Mod: PBBFAC,PO | Performed by: SURGERY

## 2023-01-13 PROCEDURE — 99215 OFFICE O/P EST HI 40 MIN: CPT | Mod: PBBFAC,PO | Performed by: SURGERY

## 2023-01-13 PROCEDURE — 10061 PR DRAIN SKIN ABSCESS COMPLIC: ICD-10-PCS | Mod: S$PBB,,, | Performed by: SURGERY

## 2023-01-13 PROCEDURE — 99999 PR PBB SHADOW E&M-EST. PATIENT-LVL V: CPT | Mod: PBBFAC,,, | Performed by: SURGERY

## 2023-01-13 PROCEDURE — 10061 I&D ABSCESS COMP/MULTIPLE: CPT | Mod: S$PBB,,, | Performed by: SURGERY

## 2023-01-13 RX ORDER — SODIUM CHLORIDE 9 MG/ML
INJECTION, SOLUTION INTRAVENOUS CONTINUOUS
Status: CANCELLED | OUTPATIENT
Start: 2023-01-13

## 2023-01-13 NOTE — H&P (VIEW-ONLY)
Surgery Clinic Note - H and P    Subjective:     Dylan Jackson is a 62 y.o. male with h/o HTN who presents to clinic for back cyst.  States it has been there for months but has never bothered him he feels like he can always push it back in it would go away, however he noticed around the beginning of January that it became red and inflamed was extremely tender to palpation.  He went to the ED on 01/05/2023 and underwent incision and drainage with the ED there and [place].  They had him follow-up with surgery for this.  States that it is still draining, they sent him home with antibiotics that he has 1 more day of Bactrim remaining.  Pain is much more improved.  Denies nausea vomiting, fevers or chills.    PMH:   Past Medical History:   Diagnosis Date    Hypertension     RLS (restless legs syndrome)        Past Surgical History: History reviewed. No pertinent surgical history.    Social History:  Social History     Socioeconomic History    Marital status: Significant Other   Tobacco Use    Smoking status: Every Day     Packs/day: 1.00     Types: Cigarettes    Smokeless tobacco: Never   Substance and Sexual Activity    Alcohol use: Not Currently     Comment: occas    Drug use: No       Allergies:   Review of patient's allergies indicates:   Allergen Reactions    Lisinopril Swelling       Medications:  Current Outpatient Medications:     amLODIPine (NORVASC) 10 MG tablet, Take 1 tablet (10 mg total) by mouth once daily., Disp: 30 tablet, Rfl: 0    metoprolol succinate (TOPROL-XL) 50 MG 24 hr tablet, Take 1 tablet (50 mg total) by mouth once daily., Disp: 30 tablet, Rfl: 0    sulfamethoxazole-trimethoprim 800-160mg (BACTRIM DS) 800-160 mg Tab, Take 1 tablet by mouth 2 (two) times daily. for 7 days, Disp: 14 tablet, Rfl: 0    UNABLE TO FIND, medication name: medication for RLS starts with M, Disp: , Rfl:     lisinopril (PRINIVIL,ZESTRIL) 20 MG tablet, Take 20 mg by mouth once daily., Disp: , Rfl:     Current  Outpatient Medications on File Prior to Visit   Medication Sig Dispense Refill    amLODIPine (NORVASC) 10 MG tablet Take 1 tablet (10 mg total) by mouth once daily. 30 tablet 0    metoprolol succinate (TOPROL-XL) 50 MG 24 hr tablet Take 1 tablet (50 mg total) by mouth once daily. 30 tablet 0    sulfamethoxazole-trimethoprim 800-160mg (BACTRIM DS) 800-160 mg Tab Take 1 tablet by mouth 2 (two) times daily. for 7 days 14 tablet 0    UNABLE TO FIND medication name: medication for RLS starts with M      lisinopril (PRINIVIL,ZESTRIL) 20 MG tablet Take 20 mg by mouth once daily.       No current facility-administered medications on file prior to visit.         Objective:     PHYSICAL EXAM:  Vital Signs (Most Recent)  Pulse: 74 (01/13/23 0807)  BP: (!) 190/110 (01/13/23 0811)    ROS A 10+ review of systems was performed with pertinent positives and negatives noted above in the history of present illness.  Other systems were negative unless otherwise specified.    Physical Exam  Constitutional:       Appearance: Normal appearance.   HENT:      Head: Normocephalic and atraumatic.   Cardiovascular:      Rate and Rhythm: Normal rate and regular rhythm.      Pulses: Normal pulses.   Pulmonary:      Effort: Pulmonary effort is normal. No respiratory distress.   Abdominal:      General: Abdomen is flat. There is no distension.      Palpations: Abdomen is soft.      Tenderness: There is no abdominal tenderness.   Skin:     General: Skin is warm and dry.      Comments: Right lower back epidermal inclusion cyst with evidence of very small punctate incision that is draining purulent material   Neurological:      General: No focal deficit present.      Mental Status: He is alert and oriented to person, place, and time.   Psychiatric:         Mood and Affect: Mood normal.         Behavior: Behavior normal.            Pertinent imaging/labs:   All pertinent labs and imaging has been reviewed by me in clinic.        Assessment:     62  y.o. male with epidermal inclusion cyst of right lower back    Plan:     - on exam, punctate incision just not large enough to drain the cyst, therefore elected to perform an incision and drainage today in clinic  -advised to continue last day of antibiotics   -we will schedule for formal excision in the operating room, consent signed in clinic today  -  Return to clinic as needed      Deon Nelson MD   [facility]

## 2023-01-13 NOTE — PROGRESS NOTES
PATIENT: Dylan Jackson    MRN: [id number]    DATE OF PROCEDURE: 01/13/2023    PREOPERATIVE DIAGNOSIS: Abscess of sebaceous cyst right lower back    POSTOPERATIVE DIAGNOSIS: same    PROCEDURE: Complex Incision and Drainage of Infected Sebaceous Cyst Right Back    SURGEON: Juanjo Vanegas M.D. and Deon Nelson M.D.    ANESTHESIA: Local    ESTIMATED BLOOD LOSS: minimal    SPECIMEN: none    COMPLICATIONS: None    PROCEDURE IN DETAIL:  After procedural consent was obtained, the area was prepped and draped in a standard sterile fashion.  Local anesthetic was administered.  An incision was made through the skin and into the abscess cavity.  Purulent fluid was expressed and the cavity was irrigated out with sterile saline.  The abscess cavity was not packed.  A sterile dressing was applied.  The patient tolerated the procedure without difficulty or complication.      Juanjo Vanegas M.D., F.A.C.S.  [id number] and General Surgery  [facility]

## 2023-01-13 NOTE — PROGRESS NOTES
Surgery Clinic Note - H and P    Subjective:     Dylan Jackson is a 62 y.o. male with h/o HTN who presents to clinic for back cyst.  States it has been there for months but has never bothered him he feels like he can always push it back in it would go away, however he noticed around the beginning of January that it became red and inflamed was extremely tender to palpation.  He went to the ED on 01/05/2023 and underwent incision and drainage with the ED there and [place].  They had him follow-up with surgery for this.  States that it is still draining, they sent him home with antibiotics that he has 1 more day of Bactrim remaining.  Pain is much more improved.  Denies nausea vomiting, fevers or chills.    PMH:   Past Medical History:   Diagnosis Date    Hypertension     RLS (restless legs syndrome)        Past Surgical History: History reviewed. No pertinent surgical history.    Social History:  Social History     Socioeconomic History    Marital status: Significant Other   Tobacco Use    Smoking status: Every Day     Packs/day: 1.00     Types: Cigarettes    Smokeless tobacco: Never   Substance and Sexual Activity    Alcohol use: Not Currently     Comment: occas    Drug use: No       Allergies:   Review of patient's allergies indicates:   Allergen Reactions    Lisinopril Swelling       Medications:  Current Outpatient Medications:     amLODIPine (NORVASC) 10 MG tablet, Take 1 tablet (10 mg total) by mouth once daily., Disp: 30 tablet, Rfl: 0    metoprolol succinate (TOPROL-XL) 50 MG 24 hr tablet, Take 1 tablet (50 mg total) by mouth once daily., Disp: 30 tablet, Rfl: 0    sulfamethoxazole-trimethoprim 800-160mg (BACTRIM DS) 800-160 mg Tab, Take 1 tablet by mouth 2 (two) times daily. for 7 days, Disp: 14 tablet, Rfl: 0    UNABLE TO FIND, medication name: medication for RLS starts with M, Disp: , Rfl:     lisinopril (PRINIVIL,ZESTRIL) 20 MG tablet, Take 20 mg by mouth once daily., Disp: , Rfl:     Current  Outpatient Medications on File Prior to Visit   Medication Sig Dispense Refill    amLODIPine (NORVASC) 10 MG tablet Take 1 tablet (10 mg total) by mouth once daily. 30 tablet 0    metoprolol succinate (TOPROL-XL) 50 MG 24 hr tablet Take 1 tablet (50 mg total) by mouth once daily. 30 tablet 0    sulfamethoxazole-trimethoprim 800-160mg (BACTRIM DS) 800-160 mg Tab Take 1 tablet by mouth 2 (two) times daily. for 7 days 14 tablet 0    UNABLE TO FIND medication name: medication for RLS starts with M      lisinopril (PRINIVIL,ZESTRIL) 20 MG tablet Take 20 mg by mouth once daily.       No current facility-administered medications on file prior to visit.         Objective:     PHYSICAL EXAM:  Vital Signs (Most Recent)  Pulse: 74 (01/13/23 0807)  BP: (!) 190/110 (01/13/23 0811)    ROS A 10+ review of systems was performed with pertinent positives and negatives noted above in the history of present illness.  Other systems were negative unless otherwise specified.    Physical Exam  Constitutional:       Appearance: Normal appearance.   HENT:      Head: Normocephalic and atraumatic.   Cardiovascular:      Rate and Rhythm: Normal rate and regular rhythm.      Pulses: Normal pulses.   Pulmonary:      Effort: Pulmonary effort is normal. No respiratory distress.   Abdominal:      General: Abdomen is flat. There is no distension.      Palpations: Abdomen is soft.      Tenderness: There is no abdominal tenderness.   Skin:     General: Skin is warm and dry.      Comments: Right lower back epidermal inclusion cyst with evidence of very small punctate incision that is draining purulent material   Neurological:      General: No focal deficit present.      Mental Status: He is alert and oriented to person, place, and time.   Psychiatric:         Mood and Affect: Mood normal.         Behavior: Behavior normal.            Pertinent imaging/labs:   All pertinent labs and imaging has been reviewed by me in clinic.        Assessment:     62  y.o. male with epidermal inclusion cyst of right lower back    Plan:     - on exam, punctate incision just not large enough to drain the cyst, therefore elected to perform an incision and drainage today in clinic  -advised to continue last day of antibiotics   -we will schedule for formal excision in the operating room, consent signed in clinic today  -  Return to clinic as needed      Deon Nelson MD   [facility]

## 2023-01-17 ENCOUNTER — ANESTHESIA EVENT (OUTPATIENT)
Dept: SURGERY | Facility: HOSPITAL | Age: 63
End: 2023-01-17

## 2023-01-20 ENCOUNTER — ANESTHESIA (OUTPATIENT)
Dept: SURGERY | Facility: HOSPITAL | Age: 63
End: 2023-01-20

## 2023-01-20 ENCOUNTER — HOSPITAL ENCOUNTER (OUTPATIENT)
Facility: HOSPITAL | Age: 63
Discharge: HOME | End: 2023-01-20
Attending: SURGERY | Admitting: SURGERY

## 2023-01-20 VITALS
BODY MASS INDEX: 27.92 KG/M2 | TEMPERATURE: 98 F | WEIGHT: 195 LBS | HEIGHT: 70 IN | DIASTOLIC BLOOD PRESSURE: 89 MMHG | SYSTOLIC BLOOD PRESSURE: 145 MMHG | HEART RATE: 65 BPM | OXYGEN SATURATION: 95 % | RESPIRATION RATE: 20 BRPM

## 2023-01-20 DIAGNOSIS — L72.3 SEBACEOUS CYST: Primary | ICD-10-CM

## 2023-01-20 DIAGNOSIS — Z01.818 PREOP TESTING: ICD-10-CM

## 2023-01-20 PROCEDURE — 25000003 PHARM REV CODE 250: Performed by: STUDENT IN AN ORGANIZED HEALTH CARE EDUCATION/TRAINING PROGRAM

## 2023-01-20 PROCEDURE — 11403 EXC TR-EXT B9+MARG 2.1-3CM: CPT | Mod: ,,, | Performed by: SURGERY

## 2023-01-20 PROCEDURE — 37000008 HC ANESTHESIA 1ST 15 MINUTES: Performed by: SURGERY

## 2023-01-20 PROCEDURE — 88304 PR  SURG PATH,LEVEL III: ICD-10-PCS | Mod: 26,,, | Performed by: DERMATOLOGY

## 2023-01-20 PROCEDURE — 11403 PR EXC SKIN BENIG 2.1-3 CM TRUNK,ARM,LEG: ICD-10-PCS | Mod: ,,, | Performed by: SURGERY

## 2023-01-20 PROCEDURE — 63600175 PHARM REV CODE 636 W HCPCS: Performed by: STUDENT IN AN ORGANIZED HEALTH CARE EDUCATION/TRAINING PROGRAM

## 2023-01-20 PROCEDURE — 12032 INTMD RPR S/A/T/EXT 2.6-7.5: CPT | Mod: 51,,, | Performed by: SURGERY

## 2023-01-20 PROCEDURE — 88304 TISSUE EXAM BY PATHOLOGIST: CPT | Performed by: DERMATOLOGY

## 2023-01-20 PROCEDURE — 12032 PR LAYR CLOS WND TRUNK,ARM,LEG 2.6-7.5 CM: ICD-10-PCS | Mod: 51,,, | Performed by: SURGERY

## 2023-01-20 PROCEDURE — D9220A PRA ANESTHESIA: ICD-10-PCS | Mod: ,,, | Performed by: ANESTHESIOLOGY

## 2023-01-20 PROCEDURE — 71000016 HC POSTOP RECOV ADDL HR: Performed by: SURGERY

## 2023-01-20 PROCEDURE — 37000009 HC ANESTHESIA EA ADD 15 MINS: Performed by: SURGERY

## 2023-01-20 PROCEDURE — 88304 TISSUE EXAM BY PATHOLOGIST: CPT | Mod: 26,,, | Performed by: DERMATOLOGY

## 2023-01-20 PROCEDURE — 88305 TISSUE EXAM BY PATHOLOGIST: CPT | Performed by: DERMATOLOGY

## 2023-01-20 PROCEDURE — D9220A PRA ANESTHESIA: Mod: ,,, | Performed by: ANESTHESIOLOGY

## 2023-01-20 PROCEDURE — 36000707: Performed by: SURGERY

## 2023-01-20 PROCEDURE — 36000706: Performed by: SURGERY

## 2023-01-20 PROCEDURE — 25000003 PHARM REV CODE 250: Performed by: SURGERY

## 2023-01-20 PROCEDURE — 71000015 HC POSTOP RECOV 1ST HR: Performed by: SURGERY

## 2023-01-20 RX ORDER — MIDAZOLAM HYDROCHLORIDE 1 MG/ML
INJECTION, SOLUTION INTRAMUSCULAR; INTRAVENOUS
Status: DISCONTINUED | OUTPATIENT
Start: 2023-01-20 | End: 2023-01-20

## 2023-01-20 RX ORDER — SODIUM CHLORIDE 0.9 % (FLUSH) 0.9 %
3 SYRINGE (ML) INJECTION
Status: DISCONTINUED | OUTPATIENT
Start: 2023-01-20 | End: 2023-01-20 | Stop reason: HOSPADM

## 2023-01-20 RX ORDER — SODIUM CHLORIDE 9 MG/ML
INJECTION, SOLUTION INTRAVENOUS CONTINUOUS
Status: DISCONTINUED | OUTPATIENT
Start: 2023-01-20 | End: 2023-01-20 | Stop reason: HOSPADM

## 2023-01-20 RX ORDER — BUPIVACAINE HYDROCHLORIDE 2.5 MG/ML
INJECTION, SOLUTION EPIDURAL; INFILTRATION; INTRACAUDAL
Status: DISCONTINUED | OUTPATIENT
Start: 2023-01-20 | End: 2023-01-20 | Stop reason: HOSPADM

## 2023-01-20 RX ORDER — LIDOCAINE HYDROCHLORIDE 10 MG/ML
INJECTION, SOLUTION EPIDURAL; INFILTRATION; INTRACAUDAL; PERINEURAL
Status: DISCONTINUED | OUTPATIENT
Start: 2023-01-20 | End: 2023-01-20 | Stop reason: HOSPADM

## 2023-01-20 RX ORDER — LIDOCAINE HYDROCHLORIDE 20 MG/ML
INJECTION INTRAVENOUS
Status: DISCONTINUED | OUTPATIENT
Start: 2023-01-20 | End: 2023-01-20

## 2023-01-20 RX ORDER — ONDANSETRON 2 MG/ML
4 INJECTION INTRAMUSCULAR; INTRAVENOUS ONCE AS NEEDED
Status: CANCELLED | OUTPATIENT
Start: 2023-01-20 | End: 2034-06-18

## 2023-01-20 RX ORDER — HYDROMORPHONE HYDROCHLORIDE 2 MG/ML
0.2 INJECTION, SOLUTION INTRAMUSCULAR; INTRAVENOUS; SUBCUTANEOUS EVERY 5 MIN PRN
Status: CANCELLED | OUTPATIENT
Start: 2023-01-20

## 2023-01-20 RX ORDER — PRAMIPEXOLE DIHYDROCHLORIDE 0.12 MG/1
0.12 TABLET ORAL NIGHTLY
COMMUNITY

## 2023-01-20 RX ORDER — KETOROLAC TROMETHAMINE 10 MG/1
10 TABLET, FILM COATED ORAL EVERY 6 HOURS PRN
Qty: 12 TABLET | Refills: 0 | Status: SHIPPED | OUTPATIENT
Start: 2023-01-20 | End: 2023-01-23

## 2023-01-20 RX ORDER — PROPOFOL 10 MG/ML
VIAL (ML) INTRAVENOUS CONTINUOUS PRN
Status: DISCONTINUED | OUTPATIENT
Start: 2023-01-20 | End: 2023-01-20

## 2023-01-20 RX ORDER — FENTANYL CITRATE 50 UG/ML
INJECTION, SOLUTION INTRAMUSCULAR; INTRAVENOUS
Status: DISCONTINUED | OUTPATIENT
Start: 2023-01-20 | End: 2023-01-20

## 2023-01-20 RX ORDER — PROPOFOL 10 MG/ML
VIAL (ML) INTRAVENOUS
Status: DISCONTINUED | OUTPATIENT
Start: 2023-01-20 | End: 2023-01-20

## 2023-01-20 RX ORDER — CEFAZOLIN SODIUM 1 G/3ML
INJECTION, POWDER, FOR SOLUTION INTRAMUSCULAR; INTRAVENOUS
Status: DISCONTINUED | OUTPATIENT
Start: 2023-01-20 | End: 2023-01-20

## 2023-01-20 RX ORDER — MEPERIDINE HYDROCHLORIDE 50 MG/ML
12.5 INJECTION INTRAMUSCULAR; INTRAVENOUS; SUBCUTANEOUS ONCE AS NEEDED
Status: CANCELLED | OUTPATIENT
Start: 2023-01-20 | End: 2023-01-21

## 2023-01-20 RX ORDER — CEFAZOLIN SODIUM 2 G/50ML
2 SOLUTION INTRAVENOUS
Status: DISCONTINUED | OUTPATIENT
Start: 2023-01-20 | End: 2023-01-20 | Stop reason: HOSPADM

## 2023-01-20 RX ADMIN — LIDOCAINE HYDROCHLORIDE 75 MG: 20 INJECTION, SOLUTION INTRAVENOUS at 01:01

## 2023-01-20 RX ADMIN — SODIUM CHLORIDE, SODIUM LACTATE, POTASSIUM CHLORIDE, AND CALCIUM CHLORIDE: .6; .31; .03; .02 INJECTION, SOLUTION INTRAVENOUS at 01:01

## 2023-01-20 RX ADMIN — CEFAZOLIN 2 G: 330 INJECTION, POWDER, FOR SOLUTION INTRAMUSCULAR; INTRAVENOUS at 01:01

## 2023-01-20 RX ADMIN — MIDAZOLAM 2 MG: 1 INJECTION INTRAMUSCULAR; INTRAVENOUS at 01:01

## 2023-01-20 RX ADMIN — FENTANYL CITRATE 50 MCG: 50 INJECTION, SOLUTION INTRAMUSCULAR; INTRAVENOUS at 01:01

## 2023-01-20 RX ADMIN — PROPOFOL 100 MCG/KG/MIN: 10 INJECTION, EMULSION INTRAVENOUS at 01:01

## 2023-01-20 RX ADMIN — PROPOFOL 80 MG: 10 INJECTION, EMULSION INTRAVENOUS at 01:01

## 2023-01-20 NOTE — PLAN OF CARE
Discharge criteria met,voicing desire to go home. Discharge instructions given to patient & wife, verbalized understanding. Discharge home ambulatory in care of wife.

## 2023-01-20 NOTE — OP NOTE
PATIENT: Dylan Jackson    MRN: [id number]    DATE OF PROCEDURE: 01/20/2023    PREOPERATIVE DIAGNOSIS: Sebaceous cyst of right lower back    POSTOPERATIVE DIAGNOSIS: same    PROCEDURE: Excision of sebaceous cyst of back (3 cm)    SURGEON: Juanjo Vanegas M.D.    ANESTHESIA: Local MAC    ESTIMATED BLOOD LOSS: minimal    SPECIMEN: as above    COMPLICATIONS: None    PROCEDURE IN DETAIL:  After surgical consent was obtained, the patient was transported to the OR and placed in the left lateral position.  A time was performed and antibiotics were given.  The area was prepped and draped in a standard sterile fashion.  Local anesthetic was injected.  A curvilinear incision was made around the cyst and this was carried down through the subcutaneous tissues.  The cyst was circumferentially dissected out and removed completely.  The incision was irrigated and closed using absorbable sutures in multiple layers.  Dermabond was applied.  The patient tolerated the procedure without any difficulty or complication.  All counts were correct and his family was updated.      Juanjo Vanegas M.D., F.A.C.S.  [facility] and General Surgery  [facility] [place]

## 2023-01-20 NOTE — ANESTHESIA PREPROCEDURE EVALUATION
01/20/2023  Dylan Jackson is a 62 y.o., male.      Pre-op Assessment    I have reviewed the Patient Summary Reports.     I have reviewed the Nursing Notes.    I have reviewed the Medications.     Review of Systems  Anesthesia Hx:  Denies Family Hx of Anesthesia complications.   Denies Personal Hx of Anesthesia complications.        Patient Active Problem List   Diagnosis    Angiotensin converting enzyme inhibitor-aggravated angioedema       Past Medical History:   Diagnosis Date    Hypertension     RLS (restless legs syndrome)        ECHO: No results found for this or any previous visit.      There is no height or weight on file to calculate BMI.    Tobacco Use: High Risk    Smoking Tobacco Use: Every Day    Smokeless Tobacco Use: Never    Passive Exposure: Not on file       Social History     Substance and Sexual Activity   Drug Use No        Alcohol Use: Not on file       Review of patient's allergies indicates:   Allergen Reactions    Lisinopril Swelling         Airway:  No value filed.     Physical Exam    Airway:  Mouth Opening: Normal  TM Distance: Normal          Anesthesia Plan  Type of Anesthesia, risks & benefits discussed:    Anesthesia Type: Gen Natural Airway  Intra-op Monitoring Plan: Standard ASA Monitors  Post Op Pain Control Plan: multimodal analgesia  Induction:  IV  Informed Consent: Informed consent signed with the Patient and all parties understand the risks and agree with anesthesia plan.  All questions answered.   ASA Score: 2  Day of Surgery Review of History & Physical: H&P Update referred to the surgeon/provider.    Ready For Surgery From Anesthesia Perspective.     .

## 2023-01-20 NOTE — ANESTHESIA POSTPROCEDURE EVALUATION
Anesthesia Post Evaluation    Patient: Dylan Jackson    Procedure(s) Performed: Procedure(s):  EXCISION, CYST (lower back)    Final Anesthesia Type: general      Patient location during evaluation: PACU  Patient participation: Yes- Able to Participate  Level of consciousness: awake and alert  Post-procedure vital signs: reviewed and stable  Pain management: adequate  Airway patency: patent    PONV status at discharge: No PONV  Anesthetic complications: no      Cardiovascular status: stable  Respiratory status: spontaneous ventilation  Hydration status: euvolemic  Follow-up not needed.          Vitals Value Taken Time   /94 01/20/23 1415   Temp 36.3 °C (97.4 °F) 01/20/23 1415   Pulse 64 01/20/23 1415   Resp 20 01/20/23 1415   SpO2 100 01/20/23 1517         No case tracking events are documented in the log.      Pain/Amita Score: Amita Score: 9 (1/20/2023  2:15 PM)

## 2023-01-20 NOTE — TRANSFER OF CARE
"Anesthesia Transfer of Care Note    Patient: Dylan Jackson    Procedure(s) Performed: Procedure(s):  EXCISION, CYST (lower back)    Patient location: OPS    Anesthesia Type: general    Transport from OR: Transported from OR on room air with adequate spontaneous ventilation    Post pain: adequate analgesia    Post assessment: no apparent anesthetic complications    Post vital signs: stable    Level of consciousness: awake and alert    Nausea/Vomiting: no nausea/vomiting    Complications: none    Transfer of care protocol was followed      Last vitals:   Visit Vitals  /94 (BP Location: Left arm, Patient Position: Lying)   Pulse 96   Temp 37.1 °C (98.8 °F) (Skin)   Resp 20   Ht 5' 10" (1.778 m)   Wt 88.5 kg (195 lb)   SpO2 95%   BMI 27.98 kg/m²     "

## 2023-01-20 NOTE — DISCHARGE SUMMARY
[facility] (Hospital)  Discharge Note  Short Stay    Procedure(s):  EXCISION, CYST (lower back)      OUTCOME: Patient tolerated treatment/procedure well without complication and is now ready for discharge.    DISPOSITION: Home or Self Care    FINAL DIAGNOSIS:  inclusion cyst    FOLLOWUP: In clinic    DISCHARGE INSTRUCTIONS:    Discharge Procedure Orders   BASIC METABOLIC PANEL   Standing Status: Future Standing Exp. Date: 03/17/24     EKG 12-lead   Standing Status: Future Standing Exp. Date: 01/17/24        TIME SPENT ON DISCHARGE: 10 minutes

## 2023-01-30 LAB
FINAL PATHOLOGIC DIAGNOSIS: NORMAL
GROSS: NORMAL
Lab: NORMAL
MICROSCOPIC EXAM: NORMAL

## 2023-02-07 ENCOUNTER — OFFICE VISIT (OUTPATIENT)
Dept: SURGERY | Facility: CLINIC | Age: 63
End: 2023-02-07

## 2023-02-07 VITALS — HEIGHT: 70 IN | BODY MASS INDEX: 29.03 KG/M2 | WEIGHT: 202.81 LBS

## 2023-02-07 DIAGNOSIS — L72.3 SEBACEOUS CYST: Primary | ICD-10-CM

## 2023-02-07 PROCEDURE — 99024 PR POST-OP FOLLOW-UP VISIT: ICD-10-PCS | Mod: ,,, | Performed by: SURGERY

## 2023-02-07 PROCEDURE — 99024 POSTOP FOLLOW-UP VISIT: CPT | Mod: ,,, | Performed by: SURGERY

## 2023-02-07 PROCEDURE — 99999 PR PBB SHADOW E&M-EST. PATIENT-LVL III: CPT | Mod: PBBFAC,,, | Performed by: SURGERY

## 2023-02-07 PROCEDURE — 99999 PR PBB SHADOW E&M-EST. PATIENT-LVL III: ICD-10-PCS | Mod: PBBFAC,,, | Performed by: SURGERY

## 2023-02-07 PROCEDURE — 99213 OFFICE O/P EST LOW 20 MIN: CPT | Mod: PBBFAC,PO | Performed by: SURGERY

## 2023-02-07 NOTE — PROGRESS NOTES
[facility]  POST-OP NOTE    HPI: Dylan Jackson is a 62 y.o. male s/p cyst excision, doing well.      VITALS:  There were no vitals filed for this visit.    PHYSICAL EXAM:  GEN: NAD  HEENT: NCAT  SKIN: incision C/D, skin opened some, no infection    PATHOLOGY:  Reviewed      ASSESSMENT & PLAN:  62 y.o. male , cover with band aid until skin closes, RTC as needed      Juanjo Vanegas M.D., F.A.C.S.  [facility] General Surgery  [facility] [place]

## 2025-03-15 ENCOUNTER — HOSPITAL ENCOUNTER (INPATIENT)
Facility: HOSPITAL | Age: 65
LOS: 1 days | Discharge: HOME | DRG: 066 | End: 2025-03-16
Attending: STUDENT IN AN ORGANIZED HEALTH CARE EDUCATION/TRAINING PROGRAM | Admitting: STUDENT IN AN ORGANIZED HEALTH CARE EDUCATION/TRAINING PROGRAM
Payer: COMMERCIAL

## 2025-03-15 DIAGNOSIS — M48.061 SPINAL STENOSIS OF LUMBAR REGION, UNSPECIFIED WHETHER NEUROGENIC CLAUDICATION PRESENT: ICD-10-CM

## 2025-03-15 DIAGNOSIS — I63.89 ACUTE UNILATERAL CEREBRAL INFARCTION IN A WATERSHED DISTRIBUTION: ICD-10-CM

## 2025-03-15 DIAGNOSIS — I63.9 STROKE: ICD-10-CM

## 2025-03-15 DIAGNOSIS — I63.89 CEREBRAL INFARCTION, WATERSHED DISTRIBUTION, UNILATERAL, ACUTE: Primary | ICD-10-CM

## 2025-03-15 DIAGNOSIS — R20.0 LEFT LEG NUMBNESS: ICD-10-CM

## 2025-03-15 PROBLEM — I25.83 CORONARY ARTERY DISEASE DUE TO LIPID RICH PLAQUE: Status: ACTIVE | Noted: 2025-03-15

## 2025-03-15 PROBLEM — Z78.9 ALCOHOL CONSUMPTION OF ONE TO FOUR DRINKS PER DAY: Status: ACTIVE | Noted: 2025-03-15

## 2025-03-15 PROBLEM — E78.2 MIXED HYPERLIPIDEMIA: Status: ACTIVE | Noted: 2025-03-15

## 2025-03-15 PROBLEM — Z72.0 TOBACCO USE: Status: ACTIVE | Noted: 2025-03-15

## 2025-03-15 PROBLEM — I25.10 CORONARY ARTERY DISEASE DUE TO LIPID RICH PLAQUE: Status: ACTIVE | Noted: 2025-03-15

## 2025-03-15 PROBLEM — R73.03 PREDIABETES: Status: ACTIVE | Noted: 2025-03-15

## 2025-03-15 PROBLEM — I65.23 BILATERAL CAROTID ARTERY STENOSIS: Status: ACTIVE | Noted: 2025-03-15

## 2025-03-15 PROBLEM — M51.369 DEGENERATION OF INTERVERTEBRAL DISC OF LUMBAR REGION WITHOUT DISCOGENIC BACK PAIN OR LOWER EXTREMITY PAIN: Status: ACTIVE | Noted: 2025-03-15

## 2025-03-15 LAB
ALBUMIN SERPL BCP-MCNC: 4 G/DL (ref 3.5–5.2)
ALP SERPL-CCNC: 127 U/L (ref 40–150)
ALT SERPL W/O P-5'-P-CCNC: 54 U/L (ref 10–44)
ANION GAP SERPL CALC-SCNC: 11 MMOL/L (ref 8–16)
AST SERPL-CCNC: 37 U/L (ref 10–40)
BASOPHILS # BLD AUTO: 0.09 K/UL (ref 0–0.2)
BASOPHILS NFR BLD: 1 % (ref 0–1.9)
BILIRUB SERPL-MCNC: 0.5 MG/DL (ref 0.1–1)
BNP SERPL-MCNC: 14 PG/ML (ref 0–99)
BUN SERPL-MCNC: 9 MG/DL (ref 8–23)
CALCIUM SERPL-MCNC: 9.3 MG/DL (ref 8.7–10.5)
CHLORIDE SERPL-SCNC: 103 MMOL/L (ref 95–110)
CHOLEST SERPL-MCNC: 208 MG/DL (ref 120–199)
CHOLEST/HDLC SERPL: 4.2 {RATIO} (ref 2–5)
CO2 SERPL-SCNC: 23 MMOL/L (ref 23–29)
CREAT SERPL-MCNC: 0.8 MG/DL (ref 0.5–1.4)
DIFFERENTIAL METHOD BLD: NORMAL
EOSINOPHIL # BLD AUTO: 0.5 K/UL (ref 0–0.5)
EOSINOPHIL NFR BLD: 5.2 % (ref 0–8)
ERYTHROCYTE [DISTWIDTH] IN BLOOD BY AUTOMATED COUNT: 13 % (ref 11.5–14.5)
EST. GFR  (NO RACE VARIABLE): >60 ML/MIN/1.73 M^2
ESTIMATED AVG GLUCOSE: 120 MG/DL (ref 68–131)
GLUCOSE SERPL-MCNC: 146 MG/DL (ref 70–110)
HBA1C MFR BLD: 5.8 % (ref 4–5.6)
HCT VFR BLD AUTO: 45.5 % (ref 40–54)
HCV AB SERPL QL IA: NORMAL
HDLC SERPL-MCNC: 49 MG/DL (ref 40–75)
HDLC SERPL: 23.6 % (ref 20–50)
HGB BLD-MCNC: 14.7 G/DL (ref 14–18)
HIV 1+2 AB+HIV1 P24 AG SERPL QL IA: NORMAL
IMM GRANULOCYTES # BLD AUTO: 0.02 K/UL (ref 0–0.04)
IMM GRANULOCYTES NFR BLD AUTO: 0.2 % (ref 0–0.5)
LDLC SERPL CALC-MCNC: 132 MG/DL (ref 63–159)
LYMPHOCYTES # BLD AUTO: 2.2 K/UL (ref 1–4.8)
LYMPHOCYTES NFR BLD: 25.1 % (ref 18–48)
MAGNESIUM SERPL-MCNC: 2.2 MG/DL (ref 1.6–2.6)
MCH RBC QN AUTO: 29.3 PG (ref 27–31)
MCHC RBC AUTO-ENTMCNC: 32.3 G/DL (ref 32–36)
MCV RBC AUTO: 91 FL (ref 82–98)
MONOCYTES # BLD AUTO: 1 K/UL (ref 0.3–1)
MONOCYTES NFR BLD: 11 % (ref 4–15)
NEUTROPHILS # BLD AUTO: 5 K/UL (ref 1.8–7.7)
NEUTROPHILS NFR BLD: 57.5 % (ref 38–73)
NONHDLC SERPL-MCNC: 159 MG/DL
NRBC BLD-RTO: 0 /100 WBC
PLATELET # BLD AUTO: 425 K/UL (ref 150–450)
PMV BLD AUTO: 10.1 FL (ref 9.2–12.9)
POTASSIUM SERPL-SCNC: 3.9 MMOL/L (ref 3.5–5.1)
PROT SERPL-MCNC: 7.7 G/DL (ref 6–8.4)
RBC # BLD AUTO: 5.02 M/UL (ref 4.6–6.2)
SODIUM SERPL-SCNC: 137 MMOL/L (ref 136–145)
TRIGL SERPL-MCNC: 135 MG/DL (ref 30–150)
TROPONIN I SERPL DL<=0.01 NG/ML-MCNC: <3 NG/L (ref 0–35)
TSH SERPL DL<=0.005 MIU/L-ACNC: 2.28 UIU/ML (ref 0.4–4)
WBC # BLD AUTO: 8.78 K/UL (ref 3.9–12.7)

## 2025-03-15 PROCEDURE — 99223 1ST HOSP IP/OBS HIGH 75: CPT | Mod: ,,, | Performed by: STUDENT IN AN ORGANIZED HEALTH CARE EDUCATION/TRAINING PROGRAM

## 2025-03-15 PROCEDURE — 11000001 HC ACUTE MED/SURG PRIVATE ROOM

## 2025-03-15 PROCEDURE — 80053 COMPREHEN METABOLIC PANEL: CPT | Performed by: PHYSICIAN ASSISTANT

## 2025-03-15 PROCEDURE — 87389 HIV-1 AG W/HIV-1&-2 AB AG IA: CPT | Performed by: PHYSICIAN ASSISTANT

## 2025-03-15 PROCEDURE — 99285 EMERGENCY DEPT VISIT HI MDM: CPT | Mod: 25

## 2025-03-15 PROCEDURE — 86803 HEPATITIS C AB TEST: CPT | Performed by: PHYSICIAN ASSISTANT

## 2025-03-15 PROCEDURE — 83735 ASSAY OF MAGNESIUM: CPT | Performed by: PHYSICIAN ASSISTANT

## 2025-03-15 PROCEDURE — 84443 ASSAY THYROID STIM HORMONE: CPT | Performed by: NURSE PRACTITIONER

## 2025-03-15 PROCEDURE — 80061 LIPID PANEL: CPT | Performed by: NURSE PRACTITIONER

## 2025-03-15 PROCEDURE — 84484 ASSAY OF TROPONIN QUANT: CPT | Performed by: PHYSICIAN ASSISTANT

## 2025-03-15 PROCEDURE — 85025 COMPLETE CBC W/AUTO DIFF WBC: CPT | Performed by: PHYSICIAN ASSISTANT

## 2025-03-15 PROCEDURE — 83036 HEMOGLOBIN GLYCOSYLATED A1C: CPT | Performed by: NURSE PRACTITIONER

## 2025-03-15 PROCEDURE — 63600175 PHARM REV CODE 636 W HCPCS: Performed by: NURSE PRACTITIONER

## 2025-03-15 PROCEDURE — 83880 ASSAY OF NATRIURETIC PEPTIDE: CPT | Performed by: PHYSICIAN ASSISTANT

## 2025-03-15 PROCEDURE — 25500020 PHARM REV CODE 255: Performed by: STUDENT IN AN ORGANIZED HEALTH CARE EDUCATION/TRAINING PROGRAM

## 2025-03-15 PROCEDURE — 99499 UNLISTED E&M SERVICE: CPT | Mod: ,,, | Performed by: NURSE PRACTITIONER

## 2025-03-15 RX ORDER — LABETALOL HCL 20 MG/4 ML
10 SYRINGE (ML) INTRAVENOUS EVERY 6 HOURS PRN
Status: DISCONTINUED | OUTPATIENT
Start: 2025-03-15 | End: 2025-03-16 | Stop reason: HOSPADM

## 2025-03-15 RX ORDER — ASPIRIN 81 MG/1
81 TABLET ORAL DAILY
Status: DISCONTINUED | OUTPATIENT
Start: 2025-03-16 | End: 2025-03-16 | Stop reason: HOSPADM

## 2025-03-15 RX ORDER — HEPARIN SODIUM 5000 [USP'U]/ML
5000 INJECTION, SOLUTION INTRAVENOUS; SUBCUTANEOUS EVERY 8 HOURS
Status: DISCONTINUED | OUTPATIENT
Start: 2025-03-15 | End: 2025-03-16 | Stop reason: HOSPADM

## 2025-03-15 RX ORDER — ATORVASTATIN CALCIUM 40 MG/1
40 TABLET, FILM COATED ORAL DAILY
Status: DISCONTINUED | OUTPATIENT
Start: 2025-03-16 | End: 2025-03-16 | Stop reason: HOSPADM

## 2025-03-15 RX ORDER — FOLIC ACID 1 MG/1
1 TABLET ORAL DAILY
Status: DISCONTINUED | OUTPATIENT
Start: 2025-03-16 | End: 2025-03-16 | Stop reason: HOSPADM

## 2025-03-15 RX ORDER — SODIUM CHLORIDE 0.9 % (FLUSH) 0.9 %
10 SYRINGE (ML) INJECTION
Status: DISCONTINUED | OUTPATIENT
Start: 2025-03-15 | End: 2025-03-16 | Stop reason: HOSPADM

## 2025-03-15 RX ORDER — IBUPROFEN 200 MG
1 TABLET ORAL DAILY PRN
Status: DISCONTINUED | OUTPATIENT
Start: 2025-03-15 | End: 2025-03-16 | Stop reason: HOSPADM

## 2025-03-15 RX ORDER — BISACODYL 10 MG/1
10 SUPPOSITORY RECTAL DAILY PRN
Status: DISCONTINUED | OUTPATIENT
Start: 2025-03-15 | End: 2025-03-16 | Stop reason: HOSPADM

## 2025-03-15 RX ORDER — THIAMINE HCL 100 MG
100 TABLET ORAL DAILY
Status: DISCONTINUED | OUTPATIENT
Start: 2025-03-16 | End: 2025-03-16 | Stop reason: HOSPADM

## 2025-03-15 RX ORDER — CLOPIDOGREL BISULFATE 75 MG/1
75 TABLET ORAL DAILY
Status: DISCONTINUED | OUTPATIENT
Start: 2025-03-16 | End: 2025-03-16 | Stop reason: HOSPADM

## 2025-03-15 RX ORDER — ONDANSETRON 8 MG/1
8 TABLET, ORALLY DISINTEGRATING ORAL EVERY 8 HOURS PRN
Status: DISCONTINUED | OUTPATIENT
Start: 2025-03-15 | End: 2025-03-16 | Stop reason: HOSPADM

## 2025-03-15 RX ORDER — POLYETHYLENE GLYCOL 3350 17 G/17G
17 POWDER, FOR SOLUTION ORAL DAILY
Status: DISCONTINUED | OUTPATIENT
Start: 2025-03-16 | End: 2025-03-16 | Stop reason: HOSPADM

## 2025-03-15 RX ORDER — ACETAMINOPHEN 325 MG/1
650 TABLET ORAL EVERY 6 HOURS PRN
Status: DISCONTINUED | OUTPATIENT
Start: 2025-03-15 | End: 2025-03-16 | Stop reason: HOSPADM

## 2025-03-15 RX ADMIN — IOHEXOL 75 ML: 350 INJECTION, SOLUTION INTRAVENOUS at 10:03

## 2025-03-15 RX ADMIN — HEPARIN SODIUM 5000 UNITS: 5000 INJECTION INTRAVENOUS; SUBCUTANEOUS at 10:03

## 2025-03-15 NOTE — ED PROVIDER NOTES
"Encounter Date: 3/15/2025       History     Chief Complaint   Patient presents with    Numbness     Pt c/o left leg numbness x 1 week.  States it feels "full"     The history is provided by the patient and medical records. No  was used.     Dylan Jackson is a 64 y.o. male with medical history of HTN, Tobacco use presenting to the ED with the chief complaint of numbness.     Reports 5 days of LLE paresthesias described as "tingling" sensation in a sock distribution from his mid thigh to his ankle. Further describes sensation like his leg is full of water but denies edema. Denies pain radiating down his leg or extremity weakness. He has chronic back pain from herniated disc that is at its baseline. Denies b/b dysfunction. No falls or trauma. No abdominal pain. Denies headache, vision changes, speech changes, numbness/paresthesias in other extremities. He has an antalgic gait at his baseline from a prior right achilles tendon injury. Reports having his achilles tendon severed several years ago after stepping on a sting ray.     Review of patient's allergies indicates:   Allergen Reactions    Lisinopril Swelling     Past Medical History:   Diagnosis Date    Hypertension     RLS (restless legs syndrome)      Past Surgical History:   Procedure Laterality Date    CYST REMOVAL  1/20/2023    Procedure: EXCISION, CYST (lower back);  Surgeon: Juanjo Vanegas MD;  Location: [facility] OR;  Service: General;;     No family history on file.  Social History[1]  Review of Systems   Neurological:  Positive for numbness.       Physical Exam     Initial Vitals [03/15/25 1455]   BP Pulse Resp Temp SpO2   (!) 188/107 82 18 98.5 °F (36.9 °C) 98 %      MAP       --         Physical Exam    Constitutional: He appears well-developed and well-nourished. He is not diaphoretic. He is easily aroused.   HENT:   Head: Normocephalic and atraumatic. Mouth/Throat: Oropharynx is clear and moist. No oropharyngeal exudate.   Eyes: " EOM and lids are normal. Pupils are equal, round, and reactive to light. No scleral icterus.   Neck: Phonation normal. Neck supple. No stridor present.   Normal range of motion.  Cardiovascular:  Normal rate and regular rhythm.           +2 DP pulses  Biphasic DP and PT doppler signals  No ulcerations to lower extremities   Pulmonary/Chest: Breath sounds normal. No stridor. No respiratory distress. He has no wheezes. He has no rales.   Abdominal: Abdomen is soft. He exhibits no distension. There is no abdominal tenderness. There is no rebound.   Musculoskeletal:         General: No tenderness or edema. Normal range of motion.      Cervical back: Normal range of motion and neck supple.      Comments: Full A/P ROM of extremities  Hypotrophy RLE     Neurological: He is alert, oriented to person, place, and time and easily aroused. He has normal strength. No sensory deficit.   No focal weakness or sensory deficit   Skin: Skin is warm and dry. No rash noted. No erythema.   Psychiatric: He has a normal mood and affect. His speech is normal.         ED Course   Critical Care    Date/Time: 3/15/2025 9:52 PM    Performed by: Sb Erickson PA-C  Authorized by: Zari Zuñiga MD  Direct patient critical care time: 15 minutes  Additional history critical care time: 6 minutes  Ordering / reviewing critical care time: 5 minutes  Documentation critical care time: 6 minutes  Consulting other physicians critical care time: 4 minutes  Total critical care time (exclusive of procedural time) : 36 minutes  Critical care was necessary to treat or prevent imminent or life-threatening deterioration of the following conditions: CNS failure or compromise.        Labs Reviewed   COMPREHENSIVE METABOLIC PANEL - Abnormal       Result Value    Sodium 137      Potassium 3.9      Chloride 103      CO2 23      Glucose 146 (*)     BUN 9      Creatinine 0.8      Calcium 9.3      Total Protein 7.7      Albumin 4.0      Total Bilirubin 0.5       Alkaline Phosphatase 127      AST 37      ALT 54 (*)     eGFR >60.0      Anion Gap 11     HEPATITIS C ANTIBODY    Hepatitis C Ab Non-reactive      Narrative:     Release to patient->Immediate   HIV 1 / 2 ANTIBODY    HIV 1/2 Ag/Ab Non-reactive      Narrative:     Release to patient->Immediate   CBC W/ AUTO DIFFERENTIAL    WBC 8.78      RBC 5.02      Hemoglobin 14.7      Hematocrit 45.5      MCV 91      MCH 29.3      MCHC 32.3      RDW 13.0      Platelets 425      MPV 10.1      Immature Granulocytes 0.2      Gran # (ANC) 5.0      Immature Grans (Abs) 0.02      Lymph # 2.2      Mono # 1.0      Eos # 0.5      Baso # 0.09      nRBC 0      Gran % 57.5      Lymph % 25.1      Mono % 11.0      Eosinophil % 5.2      Basophil % 1.0      Differential Method Automated     TROPONIN I HIGH SENSITIVITY    Troponin I High Sensitivity <3     B-TYPE NATRIURETIC PEPTIDE    BNP 14     MAGNESIUM    Magnesium 2.2     LIPID PANEL   HEMOGLOBIN A1C   TSH          Imaging Results              CTA Head and Neck (xpd) (In process)                      MRI Lumbar Spine Without Contrast (Final result)  Result time 03/15/25 20:38:21      Final result by Reynaldo Pfeiffer MD (03/15/25 20:38:21)                   Impression:      -Lumbar spondylosis most advanced at L4-L5 as detailed above.  No evidence of high-grade spinal canal stenosis or neural foraminal narrowing.  No cauda equina type lesion in the lumbar spine.    -Left paracentral disc protrusion at the L4-L5 level encroaching on the exiting left L5 nerve root.    -additional findings as above.    Electronically signed by resident: Jamari More  Date:    03/15/2025  Time:    20:15    Electronically signed by: Reynaldo Pfeiffer MD  Date:    03/15/2025  Time:    20:38               Narrative:    EXAMINATION:  MRI LUMBAR SPINE WITHOUT CONTRAST    CLINICAL HISTORY:  Low back pain, cauda equina syndrome suspected;    TECHNIQUE:  Multiplanar, multisequence MR images were acquired from the thoracolumbar  junction to the sacrum without contrast.    COMPARISON:  CT lumbar spine 03/15/2025.    FINDINGS:  Alignment: There is grade 1 anterolisthesis L4 on L5. the remainder of the lumbar alignment is within normal limits.    Vertebrae: No fracture or marrow infiltrative process.  Few vertebral hemangiomas, most notably at L3 and L4.    Discs: Mild multilevel disc height loss and desiccation.    Cord: Conus terminates at L1-L2 and appears unremarkable.  Cauda equina appears unremarkable.    Degenerative findings:    T12-L1: Mild facet arthropathy.  No spinal canal stenosis or neuroforaminal narrowing.    L1-L2: Mild facet arthropathy.  No spinal canal stenosis or neuroforaminal narrowing.    L2-L3: Small circumferential disc bulge, eccentric to the left foraminal zone.  Moderate facet arthropathy and ligamentum flavum hypertrophy.  Mild spinal canal stenosis.  No neuroforaminal narrowing.    L3-L4: Circumferential disc bulge, moderate facet arthropathy, and ligament flavum hypertrophy.  Mild spinal canal stenosis.  Mild left neural foraminal narrowing.    L4-L5: There is uncovering of the disc.  Circumferential disc bulge, moderate facet arthropathy, and ligamentum flavum hypertrophy.  There is superimposed left paracentral disc protrusion.  Mild central spinal canal stenosis with narrowing of the bilateral lateral recesses and encroachment on the descending left L5 nerve root.  No neural foraminal narrowing.    L5-S1: Mild facet arthropathy.  No spinal canal stenosis or neuroforaminal narrowing.    Paraspinal muscles & soft tissues: Unremarkable.                                       MRI Brain Without Contrast (Final result)  Result time 03/15/25 20:04:12      Final result by Reynaldo Pfeiffer MD (03/15/25 20:04:12)                   Impression:      Acute infarction in the right parietal lobe along with additional areas of linear acute infarction in the right centrum semiovale.  The distribution of the infarctions suggestive  of watershed infarcts.  No evidence of hemorrhagic conversion.    Case discussed with DAVID Basurto on 03/15/2025 at 19:59.      Electronically signed by: Reynaldo Pfeiffer MD  Date:    03/15/2025  Time:    20:04               Narrative:    EXAMINATION:  MRI BRAIN WITHOUT CONTRAST    CLINICAL HISTORY:  Transient ischemic attack (TIA);    TECHNIQUE:  Multiplanar multisequence MR imaging of the brain was performed without contrast.    COMPARISON:  CT 03/15/2025.    FINDINGS:  The craniocervical junction is intact.  The sellar and parasellar are unremarkable.  The intracranial flow voids are within normal limits.    There is diffusion restriction in the right parietal lobe.  There are additional linear scattered foci of diffusion restriction in the right centrum semiovale.  There is increased T2/FLAIR signal hyperintensity in the region of diffusion weighted signal abnormality.  No associated hemosiderin deposition is identified.    The ventricles and sulci are within normal limits for the patient's age.  There are scattered T2/FLAIR signal hyperintensities within the periventricular and subcortical white matter.  There are no extra-axial fluid collections.  There is no evidence of intracranial hemorrhage.    The orbits and intraorbital contents are unremarkable.  There is mucosal thickening within the ethmoid and maxillary sinuses.  The mastoid air cells are clear.  The calvarium is intact.                                       US Lower Extremity Veins Left (Final result)  Result time 03/15/25 17:57:08      Final result by Bertin Barroso MD (03/15/25 17:57:08)                   Impression:      No evidence of deep venous thrombosis in the left lower extremity.    Large left popliteal fossa cyst.  Correlation is advised.      Electronically signed by: Bertin Barroso MD  Date:    03/15/2025  Time:    17:57               Narrative:    EXAMINATION:  US LOWER EXTREMITY VEINS LEFT    CLINICAL HISTORY:  Anesthesia of  skin    TECHNIQUE:  Duplex and color flow Doppler evaluation and graded compression of the left lower extremity veins was performed.    COMPARISON:  None    FINDINGS:  Duplex and color flow Doppler evaluation does not reveal any evidence of acute venous thrombosis in the common femoral, superficial femoral, greater saphenous, popliteal, peroneal, anterior tibial and posterior tibial veins of the left lower extremity.  There is no reflux to suggest valvular incompetence.    There are fluid collections within the right popliteal fossa versus 1 larger complex cyst.  In conglomerate, this hypoechoic collection measures approximately 9.5 x 3.0 x 4.5 cm.                                       CT Lumbar Spine Without Contrast (Final result)  Result time 03/15/25 17:22:45      Final result by Manolo Jara MD (03/15/25 17:22:45)                   Impression:      No evidence of acute fracture or significant subluxation.    Moderate anterolisthesis of L4 on L5 with hypertrophic degenerative changes and disc bulges resulting in moderate to severe central and lateral recess stenosis.    Mild central and lateral recess stenosis at L3-4 due to disc bulge and facet arthropathy.      Electronically signed by: Manolo Jara  Date:    03/15/2025  Time:    17:22               Narrative:    EXAMINATION:  CT LUMBAR SPINE WITHOUT CONTRAST    CLINICAL HISTORY:  Low back pain, progressive neurologic deficit;    TECHNIQUE:  Low-dose axial, sagittal and coronal reformations are obtained through the lumbar spine.  Contrast was not administered.    COMPARISON:  None.    FINDINGS:  Alignment: Your listhesis of L4 on L5 with otherwise normal lordosis.    Vertebral bodies: Normal height and morphology.  No fracture.    Disc: Disc space narrowing most severe at L2-L3 and L4-L5    Degenerative changes: Moderate central and lateral recess stenosis at L3-L4 due to hypertrophic facet and ligamentous changes with short pedicles and concentric  disc bulge.    More severe central and lateral recess stenosis with constriction of the thecal sac and cauda equina at L4-L5 due to hypertrophic facet arthropathy and ligamentous changes and uncovering of the L4-5 disc from anterolisthesis of L4 on L5.    Paraspinal soft tissue: Unremarkable.    Visualized portion of the chest and abdomen: Atherosclerotic changes in nondilated aorta and iliac vessels..                                        CT Head Without Contrast (Final result)  Result time 03/15/25 16:39:02      Final result by Issac Harrell DO (03/15/25 16:39:02)                   Impression:      Motion distorted examination.  Within limits of the study there is no evidence for acute intracranial hemorrhage or sulcal effacement to suggest large territory recent infarction    Small region of hypoattenuation left isac fadia which may be artifactual versus age-indeterminate infarct.    Clinical correlation and further evaluation with MRI advised if patient compatible.      Electronically signed by: Issac Harrell DO  Date:    03/15/2025  Time:    16:39               Narrative:    EXAMINATION:  CT HEAD WITHOUT CONTRAST    CLINICAL HISTORY:  Neuro deficit, acute, stroke suspected;    TECHNIQUE:  Multiple sequential 5 mm axial images of the head without contrast.  Coronal and sagittal reformatted imaging from the axial acquisition.    COMPARISON:  None    FINDINGS:  Study slightly distorted by motion artifacts.  Allowing for artifacts there is no evidence for acute intracranial hemorrhage or sulcal effacement to suggest large territory recent infarction.  Ill-defined decreased attenuation supratentorial white matter which may be sequela of chronic ischemic change.  There is small ill-defined hypodensity left fadia which may be artifactual however area of recent infarction not excluded.  No evidence for large territory recent infarction.  No significant opacification visualized paranasal sinuses or mastoid air cells.   Further evaluation with MRI advised if patient compatible    This report was flagged in Epic as abnormal.                                       Medications   sodium chloride 0.9% flush 10 mL (has no administration in time range)   sodium chloride 0.9% bolus 500 mL 500 mL (has no administration in time range)   labetalol 20 mg/4 mL (5 mg/mL) IV syring (has no administration in time range)   bisacodyL suppository 10 mg (has no administration in time range)   heparin (porcine) injection 5,000 Units (has no administration in time range)   acetaminophen tablet 650 mg (has no administration in time range)   aspirin EC tablet 81 mg (has no administration in time range)   clopidogreL tablet 75 mg (has no administration in time range)   atorvastatin tablet 40 mg (has no administration in time range)   ondansetron disintegrating tablet 8 mg (has no administration in time range)   polyethylene glycol packet 17 g (has no administration in time range)   nicotine 14 mg/24 hr 1 patch (has no administration in time range)     Medical Decision Making  64 y.o. male with medical history of HTN, Tobacco use presenting to the ED c/o 5 days of LLE paresthesias.     DDx includes but not limited to lumbar radiculopathy, peripheral neuropathy, DVT, CVA, electrolyte disturbance. Intact distal pulses and doppler signals intact and do not suspect arterial ischemia. No red flags concerning for cauda equina.    Amount and/or Complexity of Data Reviewed  External Data Reviewed: labs and notes.  Labs: ordered. Decision-making details documented in ED Course.  Radiology: ordered and independent interpretation performed.    Risk  Decision regarding hospitalization.               ED Course as of 03/15/25 2152   Sat Mar 15, 2025   2012 CT head showing small region of hypoattenuation left isac fadia which may be artifactual versus age-indeterminate infarct. MRI brain ordered for further evaluation.   [BA]   2012 CT lumbar spine showing moderate  anterolisthesis of L4 on L5 with hypertrophic degenerative changes and disc bulges resulting in moderate to severe central and lateral recess stenosis. Mild central and lateral recess stenosis at L3-4 due to disc bulge and facet arthropathy.    Will obtain MRI lumbar spine with brain [BA]   2013 Venous US LLE negative for DVT [BA]   2013 MRI brain showing acute infarction in the right parietal lobe along with additional areas of linear acute infarction in the right centrum semiovale.  The distribution of the infarctions suggestive of watershed infarcts.  No evidence of hemorrhagic conversion. [BA]   2013 Discussed MRI findings with stroke team who will come evaluate the patient. [BA]   2146 Stroke team evaluated patient and will admit to their service. Patient expresses understanding and agreeable to the plan. I have discussed the care of this patient with my supervising physician.  [BA]      ED Course User Index  [BA] Sb Erickson PA-C                           Clinical Impression:  Final diagnoses:  [R20.0] Left leg numbness  [I63.89] Cerebral infarction, watershed distribution, unilateral, acute (Primary)  [M48.061] Spinal stenosis of lumbar region, unspecified whether neurogenic claudication present          ED Disposition Condition    Admit                     [1]   Social History  Tobacco Use    Smoking status: Every Day     Current packs/day: 1.00     Types: Cigarettes    Smokeless tobacco: Never   Substance Use Topics    Alcohol use: Not Currently     Comment: occas    Drug use: No        Sb Erickson PA-C  03/15/25 [id number]

## 2025-03-15 NOTE — ED TRIAGE NOTES
"Dylan Jackson, a 64 y.o. male presents to the ED w/ complaint of leg numbness    Triage note:  Chief Complaint   Patient presents with    Numbness     Pt c/o left leg numbness x 1 week.  States it feels "full"     Review of patient's allergies indicates:   Allergen Reactions    Lisinopril Swelling     Past Medical History:   Diagnosis Date    Hypertension     RLS (restless legs syndrome)        "

## 2025-03-16 VITALS
DIASTOLIC BLOOD PRESSURE: 91 MMHG | BODY MASS INDEX: 31.5 KG/M2 | HEIGHT: 70 IN | OXYGEN SATURATION: 93 % | WEIGHT: 220 LBS | SYSTOLIC BLOOD PRESSURE: 146 MMHG | TEMPERATURE: 99 F | HEART RATE: 66 BPM | RESPIRATION RATE: 20 BRPM

## 2025-03-16 LAB
ALBUMIN SERPL BCP-MCNC: 3.8 G/DL (ref 3.5–5.2)
ALP SERPL-CCNC: 92 U/L (ref 40–150)
ALT SERPL W/O P-5'-P-CCNC: 57 U/L (ref 10–44)
ANION GAP SERPL CALC-SCNC: 10 MMOL/L (ref 8–16)
APTT PPP: 27.7 SEC (ref 21–32)
ASCENDING AORTA: 3.9 CM
AST SERPL-CCNC: 43 U/L (ref 10–40)
AV AREA BY CONTINUOUS VTI: 2.6 CM2
AV INDEX (PROSTH): 0.7
AV LVOT MEAN GRADIENT: 4 MMHG
AV LVOT PEAK GRADIENT: 8 MMHG
AV MEAN GRADIENT: 8 MMHG
AV PEAK GRADIENT: 14 MMHG
AV VALVE AREA BY VELOCITY RATIO: 2.8 CM²
AV VALVE AREA: 2.6 CM2
AV VELOCITY RATIO: 0.74
BASOPHILS # BLD AUTO: 0.08 K/UL (ref 0–0.2)
BASOPHILS NFR BLD: 1 % (ref 0–1.9)
BILIRUB SERPL-MCNC: 0.9 MG/DL (ref 0.1–1)
BSA FOR ECHO PROCEDURE: 2.22 M2
BUN SERPL-MCNC: 8 MG/DL (ref 8–23)
CALCIUM SERPL-MCNC: 9.3 MG/DL (ref 8.7–10.5)
CHLORIDE SERPL-SCNC: 103 MMOL/L (ref 95–110)
CO2 SERPL-SCNC: 20 MMOL/L (ref 23–29)
CREAT SERPL-MCNC: 0.8 MG/DL (ref 0.5–1.4)
CV ECHO LV RWT: 0.43 CM
DIFFERENTIAL METHOD BLD: NORMAL
DOP CALC AO PEAK VEL: 1.9 M/S
DOP CALC AO VTI: 40.5 CM
DOP CALC LVOT AREA: 3.8 CM2
DOP CALC LVOT DIAMETER: 2.2 CM
DOP CALC LVOT PEAK VEL: 1.4 M/S
DOP CALC LVOT STROKE VOLUME: 107.1 CM3
DOP CALCLVOT PEAK VEL VTI: 28.2 CM
E WAVE DECELERATION TIME: 211 MS
E/A RATIO: 0.8
E/E' RATIO: 10 M/S
ECHO EF ESTIMATED: 63 %
ECHO LV POSTERIOR WALL: 1 CM (ref 0.6–1.1)
EOSINOPHIL # BLD AUTO: 0.4 K/UL (ref 0–0.5)
EOSINOPHIL NFR BLD: 4.9 % (ref 0–8)
ERYTHROCYTE [DISTWIDTH] IN BLOOD BY AUTOMATED COUNT: 12.9 % (ref 11.5–14.5)
EST. GFR  (NO RACE VARIABLE): >60 ML/MIN/1.73 M^2
FRACTIONAL SHORTENING: 32.6 % (ref 28–44)
GLUCOSE SERPL-MCNC: 106 MG/DL (ref 70–110)
HCT VFR BLD AUTO: 43.1 % (ref 40–54)
HGB BLD-MCNC: 14.2 G/DL (ref 14–18)
IMM GRANULOCYTES # BLD AUTO: 0.04 K/UL (ref 0–0.04)
IMM GRANULOCYTES NFR BLD AUTO: 0.5 % (ref 0–0.5)
INR PPP: 1 (ref 0.8–1.2)
INTERVENTRICULAR SEPTUM: 1 CM (ref 0.6–1.1)
IVRT: 114 MS
LA MAJOR: 5.4 CM
LA MINOR: 5.4 CM
LA WIDTH: 4.1 CM
LEFT ATRIUM SIZE: 3.7 CM
LEFT ATRIUM VOLUME INDEX MOD: 33 ML/M2
LEFT ATRIUM VOLUME INDEX: 32 ML/M2
LEFT ATRIUM VOLUME MOD: 71 ML
LEFT ATRIUM VOLUME: 70 CM3
LEFT INTERNAL DIMENSION IN SYSTOLE: 3.1 CM (ref 2.1–4)
LEFT VENTRICLE DIASTOLIC VOLUME INDEX: 45.62 ML/M2
LEFT VENTRICLE DIASTOLIC VOLUME: 99 ML
LEFT VENTRICLE MASS INDEX: 73.2 G/M2
LEFT VENTRICLE SYSTOLIC VOLUME INDEX: 17.1 ML/M2
LEFT VENTRICLE SYSTOLIC VOLUME: 37 ML
LEFT VENTRICULAR INTERNAL DIMENSION IN DIASTOLE: 4.6 CM (ref 3.5–6)
LEFT VENTRICULAR MASS: 158.8 G
LV LATERAL E/E' RATIO: 8.4
LV SEPTAL E/E' RATIO: 10.9
LYMPHOCYTES # BLD AUTO: 2.1 K/UL (ref 1–4.8)
LYMPHOCYTES NFR BLD: 27.3 % (ref 18–48)
MAGNESIUM SERPL-MCNC: 2.1 MG/DL (ref 1.6–2.6)
MCH RBC QN AUTO: 29.5 PG (ref 27–31)
MCHC RBC AUTO-ENTMCNC: 32.9 G/DL (ref 32–36)
MCV RBC AUTO: 89 FL (ref 82–98)
MONOCYTES # BLD AUTO: 1 K/UL (ref 0.3–1)
MONOCYTES NFR BLD: 12.7 % (ref 4–15)
MV PEAK A VEL: 0.95 M/S
MV PEAK E VEL: 0.76 M/S
NEUTROPHILS # BLD AUTO: 4.1 K/UL (ref 1.8–7.7)
NEUTROPHILS NFR BLD: 53.6 % (ref 38–73)
NRBC BLD-RTO: 0 /100 WBC
OHS CV RV/LV RATIO: 0.78 CM
PHOSPHATE SERPL-MCNC: 3.2 MG/DL (ref 2.7–4.5)
PISA TR MAX VEL: 2.6 M/S
PLATELET # BLD AUTO: 369 K/UL (ref 150–450)
PMV BLD AUTO: 10.3 FL (ref 9.2–12.9)
POTASSIUM SERPL-SCNC: 4.1 MMOL/L (ref 3.5–5.1)
PROT SERPL-MCNC: 7 G/DL (ref 6–8.4)
PROTHROMBIN TIME: 11.2 SEC (ref 9–12.5)
RA MAJOR: 4.75 CM
RA PRESSURE ESTIMATED: 3 MMHG
RA WIDTH: 3.26 CM
RBC # BLD AUTO: 4.82 M/UL (ref 4.6–6.2)
RIGHT VENTRICLE DIASTOLIC BASEL DIMENSION: 3.6 CM
RV TB RVSP: 6 MMHG
RV TISSUE DOPPLER FREE WALL SYSTOLIC VELOCITY 1 (APICAL 4 CHAMBER VIEW): 21.37 CM/S
SINUS: 3.6 CM
SODIUM SERPL-SCNC: 133 MMOL/L (ref 136–145)
STJ: 3.27 CM
TDI LATERAL: 0.09 M/S
TDI SEPTAL: 0.07 M/S
TDI: 0.08 M/S
TRICUSPID ANNULAR PLANE SYSTOLIC EXCURSION: 2.69 CM
TROPONIN I SERPL DL<=0.01 NG/ML-MCNC: <3 NG/L (ref 0–35)
TV PEAK GRADIENT: 27 MMHG
TV REST PULMONARY ARTERY PRESSURE: 30 MMHG
WBC # BLD AUTO: 7.73 K/UL (ref 3.9–12.7)
Z-SCORE OF LEFT VENTRICULAR DIMENSION IN END DIASTOLE: -4.45
Z-SCORE OF LEFT VENTRICULAR DIMENSION IN END SYSTOLE: -2.72

## 2025-03-16 PROCEDURE — 80053 COMPREHEN METABOLIC PANEL: CPT | Performed by: NURSE PRACTITIONER

## 2025-03-16 PROCEDURE — 85730 THROMBOPLASTIN TIME PARTIAL: CPT | Performed by: NURSE PRACTITIONER

## 2025-03-16 PROCEDURE — 83735 ASSAY OF MAGNESIUM: CPT | Performed by: NURSE PRACTITIONER

## 2025-03-16 PROCEDURE — 85610 PROTHROMBIN TIME: CPT | Performed by: NURSE PRACTITIONER

## 2025-03-16 PROCEDURE — 36415 COLL VENOUS BLD VENIPUNCTURE: CPT | Performed by: NURSE PRACTITIONER

## 2025-03-16 PROCEDURE — 85025 COMPLETE CBC W/AUTO DIFF WBC: CPT | Performed by: NURSE PRACTITIONER

## 2025-03-16 PROCEDURE — 63600175 PHARM REV CODE 636 W HCPCS: Performed by: NURSE PRACTITIONER

## 2025-03-16 PROCEDURE — 84100 ASSAY OF PHOSPHORUS: CPT | Performed by: NURSE PRACTITIONER

## 2025-03-16 PROCEDURE — 25000003 PHARM REV CODE 250: Performed by: NURSE PRACTITIONER

## 2025-03-16 PROCEDURE — 84484 ASSAY OF TROPONIN QUANT: CPT | Performed by: NURSE PRACTITIONER

## 2025-03-16 PROCEDURE — 99233 SBSQ HOSP IP/OBS HIGH 50: CPT | Mod: ,,, | Performed by: STUDENT IN AN ORGANIZED HEALTH CARE EDUCATION/TRAINING PROGRAM

## 2025-03-16 RX ORDER — IBUPROFEN 200 MG
1 TABLET ORAL DAILY
Start: 2025-03-16

## 2025-03-16 RX ORDER — ASPIRIN 81 MG/1
81 TABLET ORAL DAILY
Start: 2025-03-17 | End: 2026-03-17

## 2025-03-16 RX ORDER — CLOPIDOGREL BISULFATE 75 MG/1
75 TABLET ORAL DAILY
Qty: 30 TABLET | Refills: 11 | Status: SHIPPED | OUTPATIENT
Start: 2025-03-17 | End: 2026-03-17

## 2025-03-16 RX ORDER — ATORVASTATIN CALCIUM 40 MG/1
40 TABLET, FILM COATED ORAL DAILY
Qty: 90 TABLET | Refills: 3 | Status: SHIPPED | OUTPATIENT
Start: 2025-03-17 | End: 2026-03-17

## 2025-03-16 RX ORDER — LANOLIN ALCOHOL/MO/W.PET/CERES
100 CREAM (GRAM) TOPICAL DAILY
Start: 2025-03-17

## 2025-03-16 RX ORDER — FOLIC ACID 1 MG/1
1 TABLET ORAL DAILY
Start: 2025-03-17 | End: 2026-03-17

## 2025-03-16 RX ADMIN — FOLIC ACID 1 MG: 1 TABLET ORAL at 09:03

## 2025-03-16 RX ADMIN — HEPARIN SODIUM 5000 UNITS: 5000 INJECTION INTRAVENOUS; SUBCUTANEOUS at 05:03

## 2025-03-16 RX ADMIN — CLOPIDOGREL BISULFATE 75 MG: 75 TABLET ORAL at 09:03

## 2025-03-16 RX ADMIN — ATORVASTATIN CALCIUM 40 MG: 40 TABLET, FILM COATED ORAL at 09:03

## 2025-03-16 RX ADMIN — HEPARIN SODIUM 5000 UNITS: 5000 INJECTION INTRAVENOUS; SUBCUTANEOUS at 03:03

## 2025-03-16 RX ADMIN — ASPIRIN 81 MG: 81 TABLET, COATED ORAL at 09:03

## 2025-03-16 RX ADMIN — Medication 100 MG: at 09:03

## 2025-03-16 NOTE — ASSESSMENT & PLAN NOTE
-Stroke symptom. The patient states he had LLE numbness since Monday that is currently resolved.

## 2025-03-16 NOTE — ASSESSMENT & PLAN NOTE
-Vascular disease risk factor.  -SBP goal Normotensive  -Patients blood pressure range in the last 24 hours was: BP  Min: 131/69  Max: 188/107.

## 2025-03-16 NOTE — DISCHARGE SUMMARY
"Dima David - Neurosurgery ([facility])  Vascular Neurology  Comprehensive Stroke Center  Discharge Summary     Summary:     Admit Date: 3/15/2025  3:15 PM    Discharge Date and Time:  03/16/2025 6:45 PM    Attending Physician: Sarah Piña MD     Discharge Provider: Saadia Hightower NP    History of Present Illness: Dylan Jackson is a 64 y.o. male with a significant medical history of HTN, RLS, tobacco use who presents to the hospital for evaluation of LLE numbness since Monday (3/10). Per the ED provider note:    "Dylan Jackson is a 64 y.o. male with medical history of HTN, Tobacco use presenting to the ED with the chief complaint of numbness.      Reports 5 days of LLE paresthesias described as "tingling" sensation in a sock distribution from his mid thigh to his ankle. Further describes sensation like his leg is full of water but denies edema. Denies pain radiating down his leg or extremity weakness. He has chronic back pain from herniated disc that is at its baseline. Denies b/b dysfunction. No falls or trauma. No abdominal pain. Denies headache, vision changes, speech changes, numbness/paresthesias in other extremities. He has an antalgic gait at his baseline from a prior right achilles tendon injury. Reports having his achilles tendon severed several years ago after stepping on a sting ray."    ED work up included a CTH that showed a possible age indeterminate infarction in the left isac fadia. An MRI brain was obtained and showed a subacute watershed distribution infarction. Vascular Neurology was consulted.      Hospital Course (synopsis of major diagnoses, care, treatment, and services provided during the course of the hospital stay): 03/16/2025 Neuro exam stable. CTA revealing for bilateral carotid artery stenosis. Evaluated by [facility]. Will receive carotid US and follow up with Vasc Surg outpatient. ECHO completed. Pt ready for discharge.       Goals of Care Treatment Preferences:  Code " Status: Full Code      Stroke Etiology: Ischemic Undetermined Cause Unclassified due to presence of more than or equal to 1 Probable Major Cause (SHAMIR (supra-aortic large artery atherosclerosis))    STROKE DOCUMENTATION         NIH Scale:  1a. Level of Consciousness: 0-->Alert, keenly responsive  1b. LOC Questions: 0-->Answers both questions correctly  1c. LOC Commands: 0-->Performs both tasks correctly  2. Best Gaze: 0-->Normal  3. Visual: 0-->No visual loss  4. Facial Palsy: 0-->Normal symmetrical movements  5a. Motor Arm, Left: 0-->No drift, limb holds 90 (or 45) degrees for full 10 secs  5b. Motor Arm, Right: 0-->No drift, limb holds 90 (or 45) degrees for full 10 secs  6a. Motor Leg, Left: 0-->No drift, leg holds 30 degree position for full 5 secs  6b. Motor Leg, Right: 0-->No drift, leg holds 30 degree position for full 5 secs  7. Limb Ataxia: 0-->Absent  8. Sensory: 0-->Normal, no sensory loss  9. Best Language: 0-->No aphasia, normal  10. Dysarthria: 0-->Normal  11. Extinction and Inattention (formerly Neglect): 0-->No abnormality  Total (NIH Stroke Scale): 0        Modified Demar Score: 0  [place] Coma Scale:15   ABCD2 Score:    CQGB2SB4-GCI Score:   HAS -BLED Score:   ICH Score:   Hunt & Johnson Classification:       Assessment/Plan:     Diagnostic Results:      Diagnostic Results      Brain Imaging   MRI Brain 3/15/25  Impression:     Acute infarction in the right parietal lobe along with additional areas of linear acute infarction in the right centrum semiovale.  The distribution of the infarctions suggestive of watershed infarcts.  No evidence of hemorrhagic conversion.     Vessel Imaging   CTA head and neck 3/15/25  Impression:     50% diameter stenosis in the left carotid bulb.  However, hard and soft plaque with suggested posterior thrombogenic ulcerative crater is questioned.     No intracranial vascular stenosis or aneurysm.     Atypical and gyriform enhancement in the region of restricted diffusion  in the posterior right parietooccipital watershed territory.  This may represent enhancement of acute to subacute infarct and luxury perfusion.     Follow-up in 3-6 weeks to exclude other causes of enhancement in this region.     Three vessel coronary artery disease.       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 [I63.89] 03/15/2025 Yes    Tobacco use [Z72.0] 03/15/2025 Yes    Numbness of left lower extremity [R20.0] 03/15/2025 Yes    Degeneration of intervertebral disc of lumbar region without discogenic back pain or lower extremity pain [M51.369] 03/15/2025 Yes    Alcohol consumption of one to four drinks per day [Z78.9] 03/15/2025 Not Applicable    Bilateral carotid artery stenosis [I65.23] 03/15/2025 Yes    Coronary artery disease due to lipid rich plaque [I25.10, I25.83] 03/15/2025 Yes    Mixed hyperlipidemia [E78.2] 03/15/2025 Yes    Prediabetes [R73.03] 03/15/2025 Yes    Hypertension [I10] 06/26/2015 Yes      Problems Resolved During this Admission:     Neuro  * 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 male with a significant medical history of HTN, RLS, tobacco use who presents to the hospital for evaluation of LLE numbness since Monday (3/10). The patient denies any other associated symptoms such as dizziness, HA, change in vision, change in speech, or weakness. MRI Brain showed a sub-acute R watershed distribution infarction. CTA Head and Neck are pending. Given the patient's medical history and findings on imaging he is admitted to Vascular Neurology for further evaluation.    Neuro exam stable. CTA revealing for bilateral carotid artery stenosis. Evaluated by [facility]. Will receive carotid US and follow up with Vasc Surg outpatient. ECHO completed. 30 day event  cardiac monitor ordered. Pt ready for discharge.       Antithrombotics for secondary stroke prevention: Antiplatelets: Aspirin: 81 mg daily  Clopidogrel: 75 mg daily    Statins for secondary stroke prevention and hyperlipidemia, if present:   Statins: Atorvastatin- 40 mg daily    Aggressive risk factor modification: HTN, Smoking, Obesity, daily ETOH use     Rehab efforts: The patient has been evaluated by a stroke team provider and the therapy needs have been fully considered based off the presenting complaints and exam findings. The following therapy evaluations are needed: PT evaluate and treat, OT evaluate and treat    Diagnostics ordered/pending: Carotid US outpatient. Vascular Surgery will schedule.     VTE prophylaxis: Heparin 5000 units SQ every 8 hours  Mechanical prophylaxis: Place SCDs    BP parameters: Infarct: No intervention, SBP <220; Avoid hypotension.            Recommendations:     Post-discharge complication risks: Falls    Stroke Education given to: patient    Follow-up in Stroke Clinic in 4-6 weeks.     Discharge Plan:  Antithrombotics: Aspirin 81 mg, Clopidogrel 75mg  Statin: Atorvastatin 40 mg  Smoking Cessation    Follow Up:      Patient Instructions:      Ambulatory referral/consult to Smoking Cessation Program   Standing Status: Future   Referral Priority: Routine Referral Type: Consultation   Referral Reason: Specialty Services Required   Requested Specialty: CTTS   Number of Visits Requested: 1     Ambulatory Referral/Consult to Physical/Occupational Therapy   Standing Status: Future   Referral Priority: Routine Referral Type: Physical Therapy   Referral Reason: Specialty Services Required   Requested Specialty: Physical Therapy   Number of Visits Requested: 1     Ambulatory referral/consult to Vascular Neurology   Standing Status: Future   Referral Priority: Routine Referral Type: Consultation   Referral Reason: Specialty Services Required   Requested Specialty: Vascular Neurology    Number of Visits Requested: 1     Diet Cardiac   Order Comments: See Stroke Patient Education Guide Booklet for details.     Call 911 for any of the following:   Order Comments: Call 911  right away if any of the following warning signs come on suddenly, even if the symptoms only last for a few minutes. With stroke, timing is very important.   - Warning Signs of Stroke:  - Weakness: You may feel a sudden weakness, tingling or loss of feeling on one side of your face or body.  - Vision Problems: You may have sudden double vision or trouble seeing in one or both eyes.  - Speech Problems: You may have sudden trouble talking, slured speech, or problems understanding others.  - Headache: You may have sudden, severe headache.  - Movement Problems: You may experience dizziness, a feeling of spinning, a loss of balance, a feeling of falling or blackouts.     Activity as tolerated       Medications:  Reconciled Home Medications:      Medication List        START taking these medications      aspirin 81 MG EC tablet  Commonly known as: ECOTRIN  Take 1 tablet (81 mg total) by mouth once daily.  Start taking on: March 17, 2025     atorvastatin 40 MG tablet  Commonly known as: LIPITOR  Take 1 tablet (40 mg total) by mouth once daily.  Start taking on: March 17, 2025     clopidogreL 75 mg tablet  Commonly known as: PLAVIX  Take 1 tablet (75 mg total) by mouth once daily.  Start taking on: March 17, 2025     folic acid 1 MG tablet  Commonly known as: FOLVITE  Take 1 tablet (1 mg total) by mouth once daily.  Start taking on: March 17, 2025     nicotine 14 mg/24 hr  Commonly known as: NICODERM CQ  Place 1 patch onto the skin once daily.     thiamine 100 MG tablet  Take 1 tablet (100 mg total) by mouth once daily.  Start taking on: March 17, 2025            CONTINUE taking these medications      amLODIPine 10 MG tablet  Commonly known as: NORVASC  Take 1 tablet (10 mg total) by mouth once daily.     metoprolol succinate 50 MG 24 hr  tablet  Commonly known as: TOPROL-XL  Take 1 tablet (50 mg total) by mouth once daily.     pramipexole 0.125 MG tablet  Commonly known as: MIRAPEX  Take 0.125 mg by mouth every evening.              Saadia Hightower NP  [facility]  Department of Vascular Neurology   [facility])

## 2025-03-16 NOTE — ASSESSMENT & PLAN NOTE
-Stroke risk factor. Patient encouraged to stop smoking.  -Educated about smoking cessation to decrease future risk of stroke.   -Ambulatory referral to outpatient smoking cessation order placed  -Nicotine patch prn

## 2025-03-16 NOTE — ASSESSMENT & PLAN NOTE
Dylan Jackson is a 64 y.o. male with a significant medical history of HTN, RLS, tobacco use who presents to the hospital for evaluation of LLE numbness since Monday (3/10). The patient denies any other associated symptoms such as dizziness, HA, change in vision, change in speech, or weakness. MRI Brain showed a sub-acute R watershed distribution infarction. CTA Head and Neck are pending. Given the patient's medical history and findings on imaging he is admitted to Vascular Neurology for further evaluation.    -Follow up CTA Head and Neck. If significant R carotid artery stenosis, consult Vascular Surgery.    Neuro exam stable. PT/OT consults ordered to eval and treat. Carotid US and ECHO pending.     Antithrombotics for secondary stroke prevention: Antiplatelets: Aspirin: 81 mg daily  Clopidogrel: 75 mg daily    Statins for secondary stroke prevention and hyperlipidemia, if present:   Statins: Atorvastatin- 40 mg daily    Aggressive risk factor modification: HTN, Smoking, Obesity, daily ETOH use     Rehab efforts: The patient has been evaluated by a stroke team provider and the therapy needs have been fully considered based off the presenting complaints and exam findings. The following therapy evaluations are needed: PT evaluate and treat, OT evaluate and treat    Diagnostics ordered/pending: CTA Head to assess vasculature , CTA Neck/Arch to assess vasculature, HgbA1C to assess blood glucose levels, Lipid Profile to assess cholesterol levels, TTE to assess cardiac function/status , TSH to assess thyroid function    VTE prophylaxis: Heparin 5000 units SQ every 8 hours  Mechanical prophylaxis: Place SCDs    BP parameters: Infarct: No intervention, SBP <220; Avoid hypotension.

## 2025-03-16 NOTE — SUBJECTIVE & OBJECTIVE
Prescriptions Prior to Admission[1]    Review of patient's allergies indicates:   Allergen Reactions    Lisinopril Swelling       Past Medical History:   Diagnosis Date    Hypertension     RLS (restless legs syndrome)      Past Surgical History:   Procedure Laterality Date    CYST REMOVAL  1/20/2023    Procedure: EXCISION, CYST (lower back);  Surgeon: Juanjo Vanegas MD;  Location: [facility] OR;  Service: General;;     Family History    None       Tobacco Use    Smoking status: Every Day     Current packs/day: 0.50     Average packs/day: 0.5 packs/day for 20.0 years (10.0 ttl pk-yrs)     Types: Cigarettes     Start date: 3/15/2005    Smokeless tobacco: Never   Substance and Sexual Activity    Alcohol use: Yes     Alcohol/week: 3.0 standard drinks of alcohol     Types: 3 Cans of beer per week     Comment: 3 cans of beer after work    Drug use: No    Sexual activity: Not on file     Review of Systems   Constitutional:  Negative for activity change and appetite change.   Neurological:  Positive for numbness. Negative for dizziness, syncope, speech difficulty, weakness, light-headedness and headaches.     Objective:     Vital Signs (Most Recent):  Temp: 98.5 °F (36.9 °C) (03/16/25 1542)  Pulse: 66 (03/16/25 1542)  Resp: 20 (03/16/25 1542)  BP: (!) 146/91 (03/16/25 1542)  SpO2: (!) 93 % (03/16/25 1542) Vital Signs (24h Range):  Temp:  [97.8 °F (36.6 °C)-98.7 °F (37.1 °C)] 98.5 °F (36.9 °C)  Pulse:  [60-82] 66  Resp:  [15-20] 20  SpO2:  [93 %-96 %] 93 %  BP: (131-183)/() 146/91     Weight: 99.8 kg (220 lb)  Body mass index is 31.57 kg/m².      Physical Exam  HENT:      Head: Normocephalic.   Eyes:      Pupils: Pupils are equal, round, and reactive to light.   Cardiovascular:      Pulses:           Femoral pulses are 2+ on the right side and 2+ on the left side.       Dorsalis pedis pulses are 0 on the right side and 0 on the left side.        Posterior tibial pulses are 0 on the right side and 0 on the left side.    Neurological:      Mental Status: He is alert and oriented to person, place, and time. Mental status is at baseline.      Cranial Nerves: Cranial nerves 2-12 are intact.      Motor: Motor function is intact. No weakness.          Significant Labs:  All pertinent labs from the last 24 hours have been reviewed.    Significant Diagnostics:  I have reviewed and interpreted all pertinent imaging results/findings within the past 24 hours.       [1]   Medications Prior to Admission   Medication Sig Dispense Refill Last Dose/Taking    amLODIPine (NORVASC) 10 MG tablet Take 1 tablet (10 mg total) by mouth once daily. 30 tablet 0     metoprolol succinate (TOPROL-XL) 50 MG 24 hr tablet Take 1 tablet (50 mg total) by mouth once daily. 30 tablet 0     pramipexole (MIRAPEX) 0.125 MG tablet Take 0.125 mg by mouth every evening.

## 2025-03-16 NOTE — SUBJECTIVE & OBJECTIVE
Neurologic Chief Complaint: Acute unilateral cerebral infarction in watershed distribution     Subjective:     Interval History: Patient is seen for follow-up neurological assessment and treatment recommendations: See Hospital Course    HPI, Past Medical, Family, and Social History remains the same as documented in the initial encounter.     Review of Systems   Constitutional: Negative.    Respiratory: Negative.     Cardiovascular: Negative.    Skin: Negative.      Scheduled Meds:   aspirin  81 mg Oral Daily    atorvastatin  40 mg Oral Daily    clopidogreL  75 mg Oral Daily    folic acid  1 mg Oral Daily    heparin (porcine)  5,000 Units Subcutaneous Q8H    polyethylene glycol  17 g Oral Daily    thiamine  100 mg Oral Daily     Continuous Infusions:  PRN Meds:  Current Facility-Administered Medications:     acetaminophen, 650 mg, Oral, Q6H PRN    bisacodyL, 10 mg, Rectal, Daily PRN    labetalol, 10 mg, Intravenous, Q6H PRN    nicotine, 1 patch, Transdermal, Daily PRN    ondansetron, 8 mg, Oral, Q8H PRN    sodium chloride 0.9%, 500 mL, Intravenous, PRN    sodium chloride 0.9%, 10 mL, Intravenous, PRN    Objective:     Vital Signs (Most Recent):  Temp: 97.8 °F (36.6 °C) (03/16/25 0722)  Pulse: 62 (03/16/25 0722)  Resp: 18 (03/16/25 0722)  BP: (!) 156/85 (03/16/25 0722)  SpO2: 95 % (03/16/25 0722)  BP Location: Left arm    Vital Signs Range (Last 24H):  Temp:  [97.8 °F (36.6 °C)-98.8 °F (37.1 °C)]   Pulse:  [60-82]   Resp:  [15-20]   BP: (131-188)/()   SpO2:  [95 %-98 %]   BP Location: Left arm       Physical Exam  HENT:      Mouth/Throat:      Mouth: Mucous membranes are dry.   Eyes:      Pupils: Pupils are equal, round, and reactive to light.   Pulmonary:      Effort: No respiratory distress.   Skin:     General: Skin is warm and dry.   Neurological:      Mental Status: He is alert.              Neurological Exam:   LOC: alert  Attention Span: Good   Facial Movement (CN VII): Symmetric facial expression   "  Motor: Arm left  Normal 5/5  Leg left  Normal 5/5  Arm right  Normal 5/5  Leg right Normal 5/5  Sensation: Intact to light touch, temperature and vibration    Laboratory:  CMP:   Recent Labs   Lab 03/16/25  0801   CALCIUM 9.3   ALBUMIN 3.8   PROT 7.0   *   K 4.1   CO2 20*      BUN 8   CREATININE 0.8   ALKPHOS 92   ALT 57*   AST 43*   BILITOT 0.9     BMP:   Recent Labs   Lab 03/16/25  0801   *   K 4.1      CO2 20*   BUN 8   CREATININE 0.8   CALCIUM 9.3     CBC:   Recent Labs   Lab 03/16/25  0801   WBC 7.73   RBC 4.82   HGB 14.2   HCT 43.1      MCV 89   MCH 29.5   MCHC 32.9     Lipid Panel:   Recent Labs   Lab 03/15/25  2239   CHOL 208*   LDLCALC 132.0   HDL 49   TRIG 135     Coagulation:   Recent Labs   Lab 03/16/25 0801   INR 1.0   APTT 27.7     Platelet Aggregation Study: No results for input(s): "PLTAGG", "PLTAGINTERP", "PLTAGREGLACO", "ADPPLTAGGREG" in the last 168 hours.  Hgb A1C:   Recent Labs   Lab 03/15/25  2239   HGBA1C 5.8*     TSH:   Recent Labs   Lab 03/15/25  2239   TSH 2.282       Diagnostic Results     Brain Imaging   MRI Brain 3/15/25  Impression:     Acute infarction in the right parietal lobe along with additional areas of linear acute infarction in the right centrum semiovale.  The distribution of the infarctions suggestive of watershed infarcts.  No evidence of hemorrhagic conversion.    Vessel Imaging   CTA head and neck 3/15/25  Impression:     50% diameter stenosis in the left carotid bulb.  However, hard and soft plaque with suggested posterior thrombogenic ulcerative crater is questioned.     No intracranial vascular stenosis or aneurysm.     Atypical and gyriform enhancement in the region of restricted diffusion in the posterior right parietooccipital watershed territory.  This may represent enhancement of acute to subacute infarct and luxury perfusion.     Follow-up in 3-6 weeks to exclude other causes of enhancement in this region.     Three vessel " coronary artery disease.     Cardiac Imaging   Completed prior to discharge.

## 2025-03-16 NOTE — ASSESSMENT & PLAN NOTE
-Noted on MRI L spine obtained 3/15/2025.  -Patient also noted to have multiple thoracic spine deformities on xray and cervical spondylosis on CTA head and neck.  -He currently has no concerns of neck, back, or extremity pain. Denies bowel or bladder issues.  -Outpatient follow up with Neurosurgery.

## 2025-03-16 NOTE — CONSULTS
[facility] - Neurosurgery ([facility])  Vascular Surgery  Consult Note    Inpatient consult to Vascular Surgery  Consult performed by: Dillon Wilburn MD  Consult ordered by: Saadia Hightower NP        Subjective:     Chief Complaint/Reason for Admission: R sided stroke, bilateral carotid stenosis     History of Present Illness:   Dylan Jackson is a 64 y.o. male with a significant medical history of HTN, current tobacco use who presents to the hospital for evaluation of LLE numbness since Monday for previous 5 days. The patient denies any other associated symptoms such as dizziness, HA, change in vision, change in speech, or weakness. MRI Brain showed a sub-acute R watershed distribution infarction.     Vascular surgery consulted for evaluation of finding of right ICA stenosis, less than 50%.  Patient states that he is a current smoker, approximately half pack per day.  Previously only on antihypertensive medications, was not on any antiplatelet medications, or statin.    Of note, no lower extremity pedal pulses were identified.  Patient does state that he has a history of left calf cramps, when walking greater than 3 blocks.  These do not bother him significantly, he stops, and the calf cramps are relieved.     Prescriptions Prior to Admission[1]    Review of patient's allergies indicates:   Allergen Reactions    Lisinopril Swelling       Past Medical History:   Diagnosis Date    Hypertension     RLS (restless legs syndrome)      Past Surgical History:   Procedure Laterality Date    CYST REMOVAL  1/20/2023    Procedure: EXCISION, CYST (lower back);  Surgeon: Juanjo Vanegas MD;  Location: [facility] OR;  Service: General;;     Family History    None       Tobacco Use    Smoking status: Every Day     Current packs/day: 0.50     Average packs/day: 0.5 packs/day for 20.0 years (10.0 ttl pk-yrs)     Types: Cigarettes     Start date: 3/15/2005    Smokeless tobacco: Never   Substance and Sexual Activity    Alcohol use: Yes      Alcohol/week: 3.0 standard drinks of alcohol     Types: 3 Cans of beer per week     Comment: 3 cans of beer after work    Drug use: No    Sexual activity: Not on file     Review of Systems   Constitutional:  Negative for activity change and appetite change.   Neurological:  Positive for numbness. Negative for dizziness, syncope, speech difficulty, weakness, light-headedness and headaches.     Objective:     Vital Signs (Most Recent):  Temp: 98.5 °F (36.9 °C) (03/16/25 1542)  Pulse: 66 (03/16/25 1542)  Resp: 20 (03/16/25 1542)  BP: (!) 146/91 (03/16/25 1542)  SpO2: (!) 93 % (03/16/25 1542) Vital Signs (24h Range):  Temp:  [97.8 °F (36.6 °C)-98.7 °F (37.1 °C)] 98.5 °F (36.9 °C)  Pulse:  [60-82] 66  Resp:  [15-20] 20  SpO2:  [93 %-96 %] 93 %  BP: (131-183)/() 146/91     Weight: 99.8 kg (220 lb)  Body mass index is 31.57 kg/m².      Physical Exam  HENT:      Head: Normocephalic.   Eyes:      Pupils: Pupils are equal, round, and reactive to light.   Cardiovascular:      Pulses:           Femoral pulses are 2+ on the right side and 2+ on the left side.       Dorsalis pedis pulses are 0 on the right side and 0 on the left side.        Posterior tibial pulses are 0 on the right side and 0 on the left side.   Neurological:      Mental Status: He is alert and oriented to person, place, and time. Mental status is at baseline.      Cranial Nerves: Cranial nerves 2-12 are intact.      Motor: Motor function is intact. No weakness.          Significant Labs:  All pertinent labs from the last 24 hours have been reviewed.    Significant Diagnostics:  I have reviewed and interpreted all pertinent imaging results/findings within the past 24 hours.    Assessment/Plan:     Bilateral carotid artery stenosis  Dylan Jackson is a 64 y.o. male with a significant medical history of HTN, current tobacco use who presents to the hospital for evaluation of LLE numbness since Monday for previous 5 days, found to have R posterior  parietal stroke in watershed distribution.     - Evaluation for etiology of low-flow state  - R ICA lesion <50% stenosis, patient not yet on best medical therapy, agree with aspirin/Plavix, initiation of high-intensity statin.  - Will schedule follow-up as outpatient with Dr. Mcgregor with carotid duplex, lower extremity ABIs, AAA screening.        Thank you for your consult.     Dillon Wilburn MD  Vascular Surgery  [facility] - Neurosurgery ([facility])       [1]   Medications Prior to Admission   Medication Sig Dispense Refill Last Dose/Taking    amLODIPine (NORVASC) 10 MG tablet Take 1 tablet (10 mg total) by mouth once daily. 30 tablet 0     metoprolol succinate (TOPROL-XL) 50 MG 24 hr tablet Take 1 tablet (50 mg total) by mouth once daily. 30 tablet 0     pramipexole (MIRAPEX) 0.125 MG tablet Take 0.125 mg by mouth every evening.

## 2025-03-16 NOTE — ASSESSMENT & PLAN NOTE
-Stroke risk factor. The patient had not been on a statin prior to admission.    Recent Labs     03/15/25  2239   CHOL 208*   HDL 49   CHOLHDL 23.6   NONHDLCHOL 159   TOTALCHOLEST 4.2   TRIG 135   LDLCALC 132.0     -Atorvastatin 40 mg daily. Cardiac diet.

## 2025-03-16 NOTE — ASSESSMENT & PLAN NOTE
-Noted on MRI L spine obtained 3/15/2025.  -Patient also noted to have multiple thoracic spine deformities on xray.  -He currently has no concerns of back or extremity pain.  -Outpatient follow up with Neurosurgery.

## 2025-03-16 NOTE — ASSESSMENT & PLAN NOTE
-Vascular disease risk factor.  -Current SBP goal <220 for now  -Patients blood pressure range in the last 24 hours was: BP  Min: 176/98  Max: 188/107.The patient's inpatient anti-hypertensive regimen is listed below:  Current Antihypertensives  labetalol 20 mg/4 mL (5 mg/mL) IV syring, Every 6 hours PRN, Intravenous    Plan  - BP is controlled, no changes needed to their regimen  - Hold home BP meds for now, pending CTA head and neck

## 2025-03-16 NOTE — PROGRESS NOTES
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 male with a significant medical history of HTN, RLS, tobacco use who presents to the hospital for evaluation of LLE numbness since Monday (3/10). The patient denies any other associated symptoms such as dizziness, HA, change in vision, change in speech, or weakness. MRI Brain showed a sub-acute R watershed distribution infarction. CTA Head and Neck are pending. Given the patient's medical history and findings on imaging he is admitted to Vascular Neurology for further evaluation.      Neuro exam stable. CTA revealing for bilateral carotid artery stenosis. Evaluated by Vascular Surgery fellow. Will receive carotid US and follow up with Vascular Surgery outpatient. ECHO completed. Pt ready for discharge.     Antithrombotics for secondary stroke prevention: Antiplatelets: Aspirin: 81 mg daily  Clopidogrel: 75 mg daily    Statins for secondary stroke prevention and hyperlipidemia, if present:   Statins: Atorvastatin- 40 mg daily    Aggressive risk factor modification: HTN, Smoking, Obesity, daily ETOH use     Rehab efforts: The patient has been evaluated by a stroke team provider and the therapy needs have been fully considered based off the presenting complaints and exam findings. The following therapy evaluations are needed: PT evaluate and treat. Outpatient referral order placed    Diagnostics ordered/pending: Carotid US outpatient. Vascular surgery will schedule.     VTE prophylaxis: Heparin 5000 units SQ every 8 hours  Mechanical prophylaxis: Place SCDs    BP parameters: Infarct: No intervention, SBP <220; Avoid hypotension.        Prediabetes  -Stroke risk factor. A1c 5.8%.  -Pt assessed by RD  -Patient needs outpatient follow up with PCP.    Mixed hyperlipidemia  -Stroke risk factor. The  patient had not been on a statin prior to admission.    Recent Labs     03/15/25  2239   CHOL 208*   HDL 49   CHOLHDL 23.6   NONHDLCHOL 159   TOTALCHOLEST 4.2   TRIG 135   LDLCALC 132.0     -Atorvastatin 40 mg daily. Cardiac diet.    Coronary artery disease due to lipid rich plaque  -Stroke risk factor. Noted on CTA head and neck obtained 3/15/2025.  -Started on DAPT and Statin.  -TTE completed. Results pending  -Patient will need to establish care with a PCP and follow up.    Bilateral carotid artery stenosis  -Stroke risk factor. Bilateral carotid artery stenosis noted on CTA head and neck.  -Carotid US to be completed outpatient  -DAPT and statin  -Follow up in Vascular Surgery clinic outpatient    Alcohol consumption of one to four drinks per day  -Patient endorses drinking 3 beers daily after work.   -Thiamine and Folate daily  - on decreasing ETOH consumption.    Degeneration of intervertebral disc of lumbar region without discogenic back pain or lower extremity pain  -Noted on MRI L spine obtained 3/15/2025.  -Patient also noted to have multiple thoracic spine deformities on xray and cervical spondylosis on CTA head and neck.  -He currently has no concerns of neck, back, or extremity pain. Denies bowel or bladder issues.  -Outpatient follow up with Neurosurgery.    Numbness of left lower extremity  -Stroke symptom. The patient states he had LLE numbness since Monday that is currently resolved.       Tobacco use  -Stroke risk factor. Patient encouraged to stop smoking.  -Educated about smoking cessation to decrease future risk of stroke.   -Ambulatory referral to outpatient smoking cessation order placed  -Nicotine patch prn      Hypertension  -Vascular disease risk factor.  -SBP goal Normotensive  -Patients blood pressure range in the last 24 hours was: BP  Min: 131/69  Max: 188/107.           03/16/2025 Neuro exam stable. CTA revealing for bilateral carotid artery stenosis. Evaluated by [facility] Surgery.  Will receive carotid US and follow up with [facility] Surg outpatient. ECHO completed. Pt ready for discharge.       STROKE DOCUMENTATION        NIH Scale:  1a. Level of Consciousness: 0-->Alert, keenly responsive  1b. LOC Questions: 0-->Answers both questions correctly  1c. LOC Commands: 0-->Performs both tasks correctly  2. Best Gaze: 0-->Normal  3. Visual: 0-->No visual loss  4. Facial Palsy: 0-->Normal symmetrical movements  5a. Motor Arm, Left: 0-->No drift, limb holds 90 (or 45) degrees for full 10 secs  5b. Motor Arm, Right: 0-->No drift, limb holds 90 (or 45) degrees for full 10 secs  6a. Motor Leg, Left: 0-->No drift, leg holds 30 degree position for full 5 secs  6b. Motor Leg, Right: 0-->No drift, leg holds 30 degree position for full 5 secs  7. Limb Ataxia: 0-->Absent  8. Sensory: 0-->Normal, no sensory loss  9. Best Language: 0-->No aphasia, normal  10. Dysarthria: 0-->Normal  11. Extinction and Inattention (formerly Neglect): 0-->No abnormality  Total (NIH Stroke Scale): 0       Modified Henderson Score: 0  [place] Coma Scale:15   ABCD2 Score:    [id number] Score:   HAS -BLED Score:   ICH Score:   H[employer] Classification:      Hemorrhagic change of an Ischemic Stroke: Does this patient have an ischemic stroke with hemorrhagic changes? No     Neurologic Chief Complaint: Acute unilateral cerebral infarction in watershed distribution     Subjective:     Interval History: Patient is seen for follow-up neurological assessment and treatment recommendations: See Hospital Course    HPI, Past Medical, Family, and Social History remains the same as documented in the initial encounter.     Review of Systems   Constitutional: Negative.    Respiratory: Negative.     Cardiovascular: Negative.    Skin: Negative.      Scheduled Meds:   aspirin  81 mg Oral Daily    atorvastatin  40 mg Oral Daily    clopidogreL  75 mg Oral Daily    folic acid  1 mg Oral Daily    heparin (porcine)  5,000 Units Subcutaneous Q8H    polyethylene  glycol  17 g Oral Daily    thiamine  100 mg Oral Daily     Continuous Infusions:  PRN Meds:  Current Facility-Administered Medications:     acetaminophen, 650 mg, Oral, Q6H PRN    bisacodyL, 10 mg, Rectal, Daily PRN    labetalol, 10 mg, Intravenous, Q6H PRN    nicotine, 1 patch, Transdermal, Daily PRN    ondansetron, 8 mg, Oral, Q8H PRN    sodium chloride 0.9%, 500 mL, Intravenous, PRN    sodium chloride 0.9%, 10 mL, Intravenous, PRN    Objective:     Vital Signs (Most Recent):  Temp: 97.8 °F (36.6 °C) (03/16/25 0722)  Pulse: 62 (03/16/25 0722)  Resp: 18 (03/16/25 0722)  BP: (!) 156/85 (03/16/25 0722)  SpO2: 95 % (03/16/25 0722)  BP Location: Left arm    Vital Signs Range (Last 24H):  Temp:  [97.8 °F (36.6 °C)-98.8 °F (37.1 °C)]   Pulse:  [60-82]   Resp:  [15-20]   BP: (131-188)/()   SpO2:  [95 %-98 %]   BP Location: Left arm       Physical Exam  HENT:      Mouth/Throat:      Mouth: Mucous membranes are dry.   Eyes:      Pupils: Pupils are equal, round, and reactive to light.   Pulmonary:      Effort: No respiratory distress.   Skin:     General: Skin is warm and dry.   Neurological:      Mental Status: He is alert.              Neurological Exam:   LOC: alert  Attention Span: Good   Facial Movement (CN VII): Symmetric facial expression    Motor: Arm left  Normal 5/5  Leg left  Normal 5/5  Arm right  Normal 5/5  Leg right Normal 5/5  Sensation: Intact to light touch, temperature and vibration    Laboratory:  CMP:   Recent Labs   Lab 03/16/25  0801   CALCIUM 9.3   ALBUMIN 3.8   PROT 7.0   *   K 4.1   CO2 20*      BUN 8   CREATININE 0.8   ALKPHOS 92   ALT 57*   AST 43*   BILITOT 0.9     BMP:   Recent Labs   Lab 03/16/25  0801   *   K 4.1      CO2 20*   BUN 8   CREATININE 0.8   CALCIUM 9.3     CBC:   Recent Labs   Lab 03/16/25  0801   WBC 7.73   RBC 4.82   HGB 14.2   HCT 43.1      MCV 89   MCH 29.5   MCHC 32.9     Lipid Panel:   Recent Labs   Lab 03/15/25  2239   CHOL 208*   LDLCALC  "132.0   HDL 49   TRIG 135     Coagulation:   Recent Labs   Lab 03/16/25  0801   INR 1.0   APTT 27.7     Platelet Aggregation Study: No results for input(s): "PLTAGG", "PLTAGINTERP", "PLTAGREGLACO", "ADPPLTAGGREG" in the last 168 hours.  Hgb A1C:   Recent Labs   Lab 03/15/25  2239   HGBA1C 5.8*     TSH:   Recent Labs   Lab 03/15/25  2239   TSH 2.282       Diagnostic Results     Brain Imaging   MRI Brain 3/15/25  Impression:     Acute infarction in the right parietal lobe along with additional areas of linear acute infarction in the right centrum semiovale.  The distribution of the infarctions suggestive of watershed infarcts.  No evidence of hemorrhagic conversion.    Vessel Imaging   CTA head and neck 3/15/25  Impression:     50% diameter stenosis in the left carotid bulb.  However, hard and soft plaque with suggested posterior thrombogenic ulcerative crater is questioned.     No intracranial vascular stenosis or aneurysm.     Atypical and gyriform enhancement in the region of restricted diffusion in the posterior right parietooccipital watershed territory.  This may represent enhancement of acute to subacute infarct and luxury perfusion.     Follow-up in 3-6 weeks to exclude other causes of enhancement in this region.     Three vessel coronary artery disease.     Cardiac Imaging   Completed prior to discharge.     Saadia Hightower NP  Comprehensive Stroke Center  Department of Vascular Neurology   [facility]surgery ([facility])        "

## 2025-03-16 NOTE — H&P
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 male with a significant medical history of HTN, RLS, tobacco use who presents to the hospital for evaluation of LLE numbness since Monday (3/10). The patient denies any other associated symptoms such as dizziness, HA, change in vision, change in speech, or weakness. MRI Brain showed a sub-acute R watershed distribution infarction. CTA Head and Neck are pending. Given the patient's medical history and findings on imaging he is admitted to Vascular Neurology for further evaluation.    -Follow up CTA Head and Neck. If significant R carotid artery stenosis, consult Vascular Surgery.    Antithrombotics for secondary stroke prevention: Antiplatelets: Aspirin: 81 mg daily  Clopidogrel: 75 mg daily    Statins for secondary stroke prevention and hyperlipidemia, if present:   Statins: Atorvastatin- 40 mg daily    Aggressive risk factor modification: HTN, Smoking, Obesity, daily ETOH use     Rehab efforts: The patient has been evaluated by a stroke team provider and the therapy needs have been fully considered based off the presenting complaints and exam findings. The following therapy evaluations are needed: PT evaluate and treat, OT evaluate and treat    Diagnostics ordered/pending: CTA Head to assess vasculature , CTA Neck/Arch to assess vasculature, HgbA1C to assess blood glucose levels, Lipid Profile to assess cholesterol levels, TTE to assess cardiac function/status , TSH to assess thyroid function    VTE prophylaxis: Heparin 5000 units SQ every 8 hours  Mechanical prophylaxis: Place SCDs    BP  parameters: Infarct: No intervention, SBP <220; Avoid hypotension.        Numbness of left lower extremity  -Stroke symptom. The patient states he had LLE numbness since Monday that is currently resolved.       Hypertension  -Vascular disease risk factor.  -Current SBP goal <220 for now  -Patients blood pressure range in the last 24 hours was: BP  Min: 176/98  Max: 188/107.The patient's inpatient anti-hypertensive regimen is listed below:  Current Antihypertensives  labetalol 20 mg/4 mL (5 mg/mL) IV syring, Every 6 hours PRN, Intravenous    Plan  - BP is controlled, no changes needed to their regimen  - Hold home BP meds for now, pending CTA head and neck        Tobacco use  -Stroke risk factor. Patient encouraged to stop smoking.  -Continue to encourage and educate about smoking cessation.  -Will likely need outpatient referral/resources when discharged.  -Nicotine patch prn  -Keep O2 sat >92%    Prediabetes  -Stroke risk factor. A1c 5.8%.  -Patient needs outpatient follow up with PCP.    Mixed hyperlipidemia  -Stroke risk factor. The patient had not been on a statin prior to admission.    Recent Labs     03/15/25  2239   CHOL 208*   HDL 49   CHOLHDL 23.6   NONHDLCHOL 159   TOTALCHOLEST 4.2   TRIG 135   LDLCALC 132.0     -Atorvastatin 40 mg daily. Cardiac diet.    Coronary artery disease due to lipid rich plaque  -Stroke risk factor. Noted on CTA head and neck obtained 3/15/2025.  -Started on DAPT and Statin.  -TTE pending  -Patient will need to establish care with a PCP and follow up.    Bilateral carotid artery stenosis  -Stroke risk factor. Bilateral carotid artery stenosis noted on CTA head and neck.  -Carotid US pending  -DAPT and statin  -Consult Vascular Surgery    Alcohol consumption of one to four drinks per day  -Patient endorses drinking 3 beers daily after work.   -Monitor for s/s of ETOH withdrawal.  -CIWA, if score >20 consider medication for withdrawal.  -Thiamine and Folate daily  - on  "decreasing ETOH consumption.    Degeneration of intervertebral disc of lumbar region without discogenic back pain or lower extremity pain  -Noted on MRI L spine obtained 3/15/2025.  -Patient also noted to have multiple thoracic spine deformities on xray and cervical spondylosis on CTA head and neck.  -He currently has no concerns of neck, back, or extremity pain. Denies bowel or bladder issues.  -Outpatient follow up with Neurosurgery.        STROKE DOCUMENTATION          NIH Scale:  Interval: baseline  1a. Level of Consciousness: 0-->Alert, keenly responsive  1b. LOC Questions: 0-->Answers both questions correctly  1c. LOC Commands: 0-->Performs both tasks correctly  2. Best Gaze: 0-->Normal  3. Visual: 0-->No visual loss  4. Facial Palsy: 0-->Normal symmetrical movements  5a. Motor Arm, Left: 0-->No drift, limb holds 90 (or 45) degrees for full 10 secs  5b. Motor Arm, Right: 0-->No drift, limb holds 90 (or 45) degrees for full 10 secs  6a. Motor Leg, Left: 0-->No drift, leg holds 30 degree position for full 5 secs  6b. Motor Leg, Right: 0-->No drift, leg holds 30 degree position for full 5 secs  7. Limb Ataxia: 0-->Absent  8. Sensory: 0-->Normal, no sensory loss  9. Best Language: 0-->No aphasia, normal  10. Dysarthria: 0-->Normal  11. Extinction and Inattention (formerly Neglect): 0-->No abnormality  Total (NIH Stroke Scale): 0     Modified Dundy Score: 0  [place] Coma Scale:15   ABCD2 Score:    [id number] Score:   HAS -BLED Score:   ICH Score:   Hunt & Johnson Classification:          Subjective:     History of Present Illness:  Dylan Jackson is a 64 y.o. male with a significant medical history of HTN, RLS, tobacco use who presents to the hospital for evaluation of LLE numbness since Monday (3/10). Per the ED provider note:    "Dylan Jackson is a 64 y.o. male with medical history of HTN, Tobacco use presenting to the ED with the chief complaint of numbness.      Reports 5 days of LLE paresthesias " "described as "tingling" sensation in a sock distribution from his mid thigh to his ankle. Further describes sensation like his leg is full of water but denies edema. Denies pain radiating down his leg or extremity weakness. He has chronic back pain from herniated disc that is at its baseline. Denies b/b dysfunction. No falls or trauma. No abdominal pain. Denies headache, vision changes, speech changes, numbness/paresthesias in other extremities. He has an antalgic gait at his baseline from a prior right achilles tendon injury. Reports having his achilles tendon severed several years ago after stepping on a sting ray."    ED work up included a CTH that showed a possible age indeterminate infarction in the left isac fadia. An MRI brain was obtained and showed a subacute watershed distribution infarction. Vascular Neurology was consulted.            Past Medical History:   Diagnosis Date    Hypertension     RLS (restless legs syndrome)      Past Surgical History:   Procedure Laterality Date    CYST REMOVAL  1/20/2023    Procedure: EXCISION, CYST (lower back);  Surgeon: Juanjo Vanegas MD;  Location: [facility];  Service: General;;     Social History[1]  Review of patient's allergies indicates:   Allergen Reactions    Lisinopril Swelling       Medications: I have reviewed the current medication administration record.    Current Outpatient Medications   Medication Instructions    amLODIPine (NORVASC) 10 mg, Oral, Daily    metoprolol succinate (TOPROL-XL) 50 mg, Oral, Daily    pramipexole (MIRAPEX) 0.125 mg, Oral, Nightly     Review of Systems   HENT:  Negative for trouble swallowing.    Eyes:  Negative for visual disturbance.   Respiratory:  Positive for shortness of breath (with activity).    Gastrointestinal:  Negative for abdominal pain, diarrhea, nausea and vomiting.   Musculoskeletal:  Negative for gait problem.   Neurological:  Positive for numbness. Negative for dizziness, facial asymmetry, speech difficulty, " weakness, light-headedness and headaches.   Psychiatric/Behavioral:  Negative for confusion.    All other systems reviewed and are negative.    Objective:     Vital Signs (Most Recent):  Temp: 98.3 °F (36.8 °C) (03/15/25 2113)  Pulse: 80 (03/15/25 2113)  Resp: 18 (03/15/25 2113)  BP: (!) 183/107 (03/15/25 2113)  SpO2: 95 % (03/15/25 2113)    Vital Signs Range (Last 24H):  Temp:  [98.3 °F (36.8 °C)-98.8 °F (37.1 °C)]   Pulse:  [78-82]   Resp:  [18-20]   BP: (176-188)/()   SpO2:  [95 %-98 %]        Physical Exam  Vitals and nursing note reviewed.   Eyes:      General:         Right eye: No discharge.         Left eye: No discharge.      Extraocular Movements: Extraocular movements intact.      Conjunctiva/sclera: Conjunctivae normal.   Cardiovascular:      Rate and Rhythm: Normal rate.   Pulmonary:      Effort: Pulmonary effort is normal. No respiratory distress.      Breath sounds: Transmitted upper airway sounds present.   Abdominal:      General: There is no distension.   Musculoskeletal:      Cervical back: Normal range of motion and neck supple.   Skin:     General: Skin is warm and dry.   Neurological:      Mental Status: He is alert and oriented to person, place, and time.      Cranial Nerves: No cranial nerve deficit.      Sensory: No sensory deficit.      Motor: No weakness.   Psychiatric:         Mood and Affect: Mood normal.              Neurological Exam:   LOC: alert  Attention Span: Good   Language: No aphasia  Articulation: No dysarthria  Orientation: Person, Place, Time   Visual Fields: Full  EOM (CN III, IV, VI): Full/intact  Facial Movement (CN VII): Symmetric facial expression    Motor: Arm left  Normal 5/5  Leg left  Normal 5/5  Arm right  Normal 5/5  Leg right Normal 5/5  Cerebellum: No evidence of appendicular or axial ataxia  Sensation: intact to light touch and pin prick  Tone: Normal tone throughout      Laboratory:  CMP:   Recent Labs   Lab 03/15/25  1557   CALCIUM 9.3   ALBUMIN 4.0  "  PROT 7.7      K 3.9   CO2 23      BUN 9   CREATININE 0.8   ALKPHOS 127   ALT 54*   AST 37   BILITOT 0.5     CBC:   Recent Labs   Lab 03/15/25  1557   WBC 8.78   RBC 5.02   HGB 14.7   HCT 45.5      MCV 91   MCH 29.3   MCHC 32.3     Lipid Panel: No results for input(s): "CHOL", "LDLCALC", "HDL", "TRIG" in the last 168 hours.  Coagulation: No results for input(s): "PT", "INR", "APTT" in the last 168 hours.  Hgb A1C: No results for input(s): "HGBA1C" in the last 168 hours.  TSH: No results for input(s): "TSH" in the last 168 hours.    Diagnostic Results:      Brain imaging:  MRI Brain 3/15/2025 Impression: Acute infarction in the right parietal lobe along with additional areas of linear acute infarction in the right centrum semiovale.  The distribution of the infarctions suggestive of watershed infarcts.  No evidence of hemorrhagic conversion.    CTH 3/15/2025 Impression: Motion distorted examination.  Within limits of the study there is no evidence for acute intracranial hemorrhage or sulcal effacement to suggest large territory recent infarction     Small region of hypoattenuation left isac fadia which may be artifactual versus age-indeterminate infarct.    Vessel Imaging:  CTA Head and Neck pending    Cardiac Evaluation:   TTE pending        Bety Thao DNP  [facility]  Department of Vascular Neurology   [facility] - Emergency Dept              [1]   Social History  Tobacco Use    Smoking status: Every Day     Current packs/day: 1.00     Types: Cigarettes    Smokeless tobacco: Never   Substance Use Topics    Alcohol use: Not Currently     Comment: occas    Drug use: No     "

## 2025-03-16 NOTE — HOSPITAL COURSE
03/16/2025 Neuro exam stable. CTA revealing for bilateral carotid artery stenosis. Evaluated by [facility] Surgery. Will receive carotid US and follow up with [facility] Surg outpatient. ECHO completed. Pt ready for discharge.

## 2025-03-16 NOTE — ASSESSMENT & PLAN NOTE
-Stroke risk factor. Noted on CTA head and neck obtained 3/15/2025.  -Started on DAPT and Statin.  -TTE completed. Results pending  -Patient will need to establish care with a PCP and follow up.

## 2025-03-16 NOTE — PLAN OF CARE
[facility] - Neurosurgery (Hospital)  Initial Discharge Assessment       Primary Care Provider: No, Primary Doctor    Admission Diagnosis: Left leg numbness [R20.0]  Cerebral infarction, watershed distribution, unilateral, acute [I63.89]  Spinal stenosis of lumbar region, unspecified whether neurogenic claudication present [M48.061]    Admission Date: 3/15/2025  Expected Discharge Date:     Transition of Care Barriers: Unisured    Payor: /     Extended Emergency Contact Information  Primary Emergency Contact: Tasha Wang   [facility]  Home Phone: [phone]  Relation: Significant other    Discharge Plan A: Home  Discharge Plan B: Home with family      [facility] - [place] - [address]  [address]  [place] [address]  Phone: [phone] Fax: [phone]      Initial Assessment (most recent)       Adult Discharge Assessment - 03/16/25 1143          Discharge Assessment    Assessment Type Discharge Planning Assessment     Confirmed/corrected address, phone number and insurance Yes     Confirmed Demographics Correct on Facesheet     Source of Information patient     Does patient/caregiver understand observation status Yes     Communicated VIRAL with patient/caregiver Yes;Date not available/Unable to determine     Reason For Admission Acute unilateral cerebral infarction in a watershed distribution     People in Home significant other     Do you expect to return to your current living situation? Yes     Do you have help at home or someone to help you manage your care at home? Yes     Prior to hospitilization cognitive status: Alert/Oriented     Current cognitive status: Alert/Oriented     Walking or Climbing Stairs Difficulty no     Dressing/Bathing Difficulty no     Equipment Currently Used at Home none     Readmission within 30 days? No     Patient currently being followed by outpatient case management? No     Do you currently have service(s) that help you manage your care at home? No      Do you take prescription medications? No     Do you have prescription coverage? No     How do you get to doctors appointments? car, drives self;family or friend will provide     Are you on dialysis? No     Do you take coumadin? No     Discharge Plan A Home     Discharge Plan B Home with family     DME Needed Upon Discharge  other (see comments)   TBD    Discharge Plan discussed with: Patient     Transition of Care Barriers Unisured                   Spoke to patient. Patient lives at home with his significant other. Post hospital stay,  patients SO will be patients support person. Patient has transportation at d/c with his SO. There have been no hospitalizations within the last 30 days per pt. Verified patients PCP and preferred pharmacy. Patient stated not on Coumadin and is not receiving dialysis. All questions answered regarding case management/ discharge planning , patient verbalized understanding. Discharge booklet with SW contact information given to patient.    SW emailed [facility] per patients request.     Discharge Plan A and Plan B have been determined by review of patient's clinical status, future medical and therapeutic needs, and coverage/benefits for post-acute care in coordination with multidisciplinary team members.      LINDSEY Garces, MSW  Case Management  [facility]

## 2025-03-16 NOTE — ASSESSMENT & PLAN NOTE
-Stroke risk factor. Patient encouraged to stop smoking.  -Continue to encourage and educate about smoking cessation.  -Will likely need outpatient referral/resources when discharged.  -Nicotine patch prn

## 2025-03-16 NOTE — ASSESSMENT & PLAN NOTE
Dylan Jackson is a 64 y.o. male with a significant medical history of HTN, RLS, tobacco use who presents to the hospital for evaluation of LLE numbness since Monday (3/10). The patient denies any other associated symptoms such as dizziness, HA, change in vision, change in speech, or weakness. MRI Brain showed a sub-acute R watershed distribution infarction. CTA Head and Neck are pending. Given the patient's medical history and findings on imaging he is admitted to Vascular Neurology for further evaluation.    Neuro exam stable. CTA revealing for bilateral carotid artery stenosis. Evaluated by [facility] Surgery. Will receive carotid US and follow up with [facility] Surg outpatient. ECHO completed. 30 day event cardiac monitor ordered. Pt ready for discharge.       Antithrombotics for secondary stroke prevention: Antiplatelets: Aspirin: 81 mg daily  Clopidogrel: 75 mg daily    Statins for secondary stroke prevention and hyperlipidemia, if present:   Statins: Atorvastatin- 40 mg daily    Aggressive risk factor modification: HTN, Smoking, Obesity, daily ETOH use     Rehab efforts: The patient has been evaluated by a stroke team provider and the therapy needs have been fully considered based off the presenting complaints and exam findings. The following therapy evaluations are needed: PT evaluate and treat, OT evaluate and treat    Diagnostics ordered/pending: Carotid US outpatient. Vascular Surgery will schedule.     VTE prophylaxis: Heparin 5000 units SQ every 8 hours  Mechanical prophylaxis: Place SCDs    BP parameters: Infarct: No intervention, SBP <220; Avoid hypotension.

## 2025-03-16 NOTE — ASSESSMENT & PLAN NOTE
-Patient endorses drinking 3 beers daily after work.   -Thiamine and Folate daily  - on decreasing ETOH consumption.

## 2025-03-16 NOTE — ASSESSMENT & PLAN NOTE
-Stroke risk factor. Bilateral carotid artery stenosis noted on CTA head and neck.  -Carotid US to be completed outpatient  -DAPT and statin  -Follow up in Vascular Surgery clinic outpatient

## 2025-03-16 NOTE — CONSULTS
Food & Nutrition  Education     Diet Education: Cardiac diet   Time Spent: 10 minutes   Learners: Patient     Handouts provided: Heart Healthy - Reduced Sodium Nutrition Therapy     Comments: Discussed importance of eating a balanced diet with whole grains, fruits and vegetables, and lean protein sources. Encouraged heart-healthy unsaturated fats while limiting saturated fats, trans fats, and cholesterol intake. Reviewed high sodium foods that should be avoided. Food labels, salt free seasonings, and recommended sodium intake reviewed. All questions/concerns were addressed.      Follow-Up: Yes     Please Re-consult as needed.   Thanks!

## 2025-03-16 NOTE — HPI
Dylan Jackson is a 64 y.o. male with a significant medical history of HTN, current tobacco use who presents to the hospital for evaluation of LLE numbness since Monday for previous 5 days. The patient denies any other associated symptoms such as dizziness, HA, change in vision, change in speech, or weakness. MRI Brain showed a sub-acute R watershed distribution infarction.     Vascular surgery consulted for evaluation of finding of right ICA stenosis, less than 50%.  Patient states that he is a current smoker, approximately half pack per day.  Previously only on antihypertensive medications, was not on any antiplatelet medications, or statin.    Of note, no lower extremity pedal pulses were identified.  Patient does state that he has a history of left calf cramps, when walking greater than 3 blocks.  These do not bother him significantly, he stops, and the calf cramps are relieved.

## 2025-03-16 NOTE — ASSESSMENT & PLAN NOTE
Dylan Jackson is a 64 y.o. male with a significant medical history of HTN, current tobacco use who presents to the hospital for evaluation of LLE numbness since Monday for previous 5 days, found to have R posterior parietal stroke in watershed distribution.     - Evaluation for etiology of low-flow state  - R ICA lesion <50% stenosis, patient not yet on best medical therapy, agree with aspirin/Plavix, initiation of high-intensity statin.  - Will schedule follow-up as outpatient with Dr. Mcgregor with carotid duplex, lower extremity ABIs, AAA screening.

## 2025-03-16 NOTE — ASSESSMENT & PLAN NOTE
Dylan Jackson is a 64 y.o. male with a significant medical history of HTN, RLS, tobacco use who presents to the hospital for evaluation of LLE numbness since Monday (3/10). The patient denies any other associated symptoms such as dizziness, HA, change in vision, change in speech, or weakness. MRI Brain showed a sub-acute R watershed distribution infarction. CTA Head and Neck are pending. Given the patient's medical history and findings on imaging he is admitted to Vascular Neurology for further evaluation.    -Follow up CTA Head and Neck. If significant R carotid artery stenosis, consult Vascular Surgery.    Antithrombotics for secondary stroke prevention: Antiplatelets: Aspirin: 81 mg daily  Clopidogrel: 75 mg daily    Statins for secondary stroke prevention and hyperlipidemia, if present:   Statins: Atorvastatin- 40 mg daily    Aggressive risk factor modification: HTN, Smoking, Obesity     Rehab efforts: The patient has been evaluated by a stroke team provider and the therapy needs have been fully considered based off the presenting complaints and exam findings. The following therapy evaluations are needed: PT evaluate and treat, OT evaluate and treat    Diagnostics ordered/pending: CTA Head to assess vasculature , CTA Neck/Arch to assess vasculature, HgbA1C to assess blood glucose levels, Lipid Profile to assess cholesterol levels, TTE to assess cardiac function/status , TSH to assess thyroid function    VTE prophylaxis: Heparin 5000 units SQ every 8 hours  Mechanical prophylaxis: Place SCDs    BP parameters: Infarct: No intervention, SBP <220; Avoid hypotension.

## 2025-03-16 NOTE — SUBJECTIVE & OBJECTIVE
Past Medical History:   Diagnosis Date    Hypertension     RLS (restless legs syndrome)      Past Surgical History:   Procedure Laterality Date    CYST REMOVAL  1/20/2023    Procedure: EXCISION, CYST (lower back);  Surgeon: Juanjo Vanegas MD;  Location: [facility];  Service: General;;     Social History[1]  Review of patient's allergies indicates:   Allergen Reactions    Lisinopril Swelling       Medications: I have reviewed the current medication administration record.    Current Outpatient Medications   Medication Instructions    amLODIPine (NORVASC) 10 mg, Oral, Daily    metoprolol succinate (TOPROL-XL) 50 mg, Oral, Daily    pramipexole (MIRAPEX) 0.125 mg, Oral, Nightly     Review of Systems   HENT:  Negative for trouble swallowing.    Eyes:  Negative for visual disturbance.   Respiratory:  Positive for shortness of breath (with activity).    Gastrointestinal:  Negative for abdominal pain, diarrhea, nausea and vomiting.   Musculoskeletal:  Negative for gait problem.   Neurological:  Positive for numbness. Negative for dizziness, facial asymmetry, speech difficulty, weakness, light-headedness and headaches.   Psychiatric/Behavioral:  Negative for confusion.    All other systems reviewed and are negative.    Objective:     Vital Signs (Most Recent):  Temp: 98.3 °F (36.8 °C) (03/15/25 2113)  Pulse: 80 (03/15/25 2113)  Resp: 18 (03/15/25 2113)  BP: (!) 183/107 (03/15/25 2113)  SpO2: 95 % (03/15/25 2113)    Vital Signs Range (Last 24H):  Temp:  [98.3 °F (36.8 °C)-98.8 °F (37.1 °C)]   Pulse:  [78-82]   Resp:  [18-20]   BP: (176-188)/()   SpO2:  [95 %-98 %]        Physical Exam  Vitals and nursing note reviewed.   Eyes:      General:         Right eye: No discharge.         Left eye: No discharge.      Extraocular Movements: Extraocular movements intact.      Conjunctiva/sclera: Conjunctivae normal.   Cardiovascular:      Rate and Rhythm: Normal rate.   Pulmonary:      Effort: Pulmonary effort is normal. No  "respiratory distress.      Breath sounds: Transmitted upper airway sounds present.   Abdominal:      General: There is no distension.   Musculoskeletal:      Cervical back: Normal range of motion and neck supple.   Skin:     General: Skin is warm and dry.   Neurological:      Mental Status: He is alert and oriented to person, place, and time.      Cranial Nerves: No cranial nerve deficit.      Sensory: No sensory deficit.      Motor: No weakness.   Psychiatric:         Mood and Affect: Mood normal.              Neurological Exam:   LOC: alert  Attention Span: Good   Language: No aphasia  Articulation: No dysarthria  Orientation: Person, Place, Time   Visual Fields: Full  EOM (CN III, IV, VI): Full/intact  Facial Movement (CN VII): Symmetric facial expression    Motor: Arm left  Normal 5/5  Leg left  Normal 5/5  Arm right  Normal 5/5  Leg right Normal 5/5  Cerebellum: No evidence of appendicular or axial ataxia  Sensation: intact to light touch and pin prick  Tone: Normal tone throughout      Laboratory:  CMP:   Recent Labs   Lab 03/15/25  1557   CALCIUM 9.3   ALBUMIN 4.0   PROT 7.7      K 3.9   CO2 23      BUN 9   CREATININE 0.8   ALKPHOS 127   ALT 54*   AST 37   BILITOT 0.5     CBC:   Recent Labs   Lab 03/15/25  1557   WBC 8.78   RBC 5.02   HGB 14.7   HCT 45.5      MCV 91   MCH 29.3   MCHC 32.3     Lipid Panel: No results for input(s): "CHOL", "LDLCALC", "HDL", "TRIG" in the last 168 hours.  Coagulation: No results for input(s): "PT", "INR", "APTT" in the last 168 hours.  Hgb A1C: No results for input(s): "HGBA1C" in the last 168 hours.  TSH: No results for input(s): "TSH" in the last 168 hours.    Diagnostic Results:      Brain imaging:  MRI Brain 3/15/2025 Impression: Acute infarction in the right parietal lobe along with additional areas of linear acute infarction in the right centrum semiovale.  The distribution of the infarctions suggestive of watershed infarcts.  No evidence of hemorrhagic " conversion.    [facility] 3/15/2025 Impression: Motion distorted examination.  Within limits of the study there is no evidence for acute intracranial hemorrhage or sulcal effacement to suggest large territory recent infarction     Small region of hypoattenuation left isac fadia which may be artifactual versus age-indeterminate infarct.    Vessel Imaging:  CTA Head and Neck pending    Cardiac Evaluation:   TTE pending         [1]   Social History  Tobacco Use    Smoking status: Every Day     Current packs/day: 1.00     Types: Cigarettes    Smokeless tobacco: Never   Substance Use Topics    Alcohol use: Not Currently     Comment: occas    Drug use: No

## 2025-03-16 NOTE — HPI
"Dylan Jackson is a 64 y.o. male with a significant medical history of HTN, RLS, tobacco use who presents to the hospital for evaluation of LLE numbness since Monday (3/10). Per the ED provider note:    "Dylan Jackson is a 64 y.o. male with medical history of HTN, Tobacco use presenting to the ED with the chief complaint of numbness.      Reports 5 days of LLE paresthesias described as "tingling" sensation in a sock distribution from his mid thigh to his ankle. Further describes sensation like his leg is full of water but denies edema. Denies pain radiating down his leg or extremity weakness. He has chronic back pain from herniated disc that is at its baseline. Denies b/b dysfunction. No falls or trauma. No abdominal pain. Denies headache, vision changes, speech changes, numbness/paresthesias in other extremities. He has an antalgic gait at his baseline from a prior right achilles tendon injury. Reports having his achilles tendon severed several years ago after stepping on a sting ray."    ED work up included a CTH that showed a possible age indeterminate infarction in the left isac fadia. An MRI brain was obtained and showed a subacute watershed distribution infarction. Vascular Neurology was consulted.    "

## 2025-03-25 ENCOUNTER — TELEPHONE (OUTPATIENT)
Dept: VASCULAR SURGERY | Facility: CLINIC | Age: 65
End: 2025-03-25
Payer: COMMERCIAL

## 2025-03-25 DIAGNOSIS — I65.23 BILATERAL CAROTID ARTERY STENOSIS: ICD-10-CM

## 2025-03-25 DIAGNOSIS — Z72.0 TOBACCO USE: Primary | ICD-10-CM

## 2025-03-25 DIAGNOSIS — I73.9 PAD (PERIPHERAL ARTERY DISEASE): ICD-10-CM
